# Patient Record
Sex: FEMALE | Race: WHITE | NOT HISPANIC OR LATINO | Employment: OTHER | URBAN - METROPOLITAN AREA
[De-identification: names, ages, dates, MRNs, and addresses within clinical notes are randomized per-mention and may not be internally consistent; named-entity substitution may affect disease eponyms.]

---

## 2020-02-24 NOTE — PROGRESS NOTES
[facility] at [facility]  Follow Up Visit  Aretha Angela  1940    VISIT DATE:  02/25/20    PCP:   Kt Heredia MD  [address] [address]      CC:  new patient  History of aortic valve replacement    Problem List:  1.  Status post bioprosthetic aortic valve replacement severe Aortic Stenosis  -Status post 21 mm Arce magna ease pericardial prosthetic valve in 2010 with Dr. Flores  2. -No obstructive coronary disease during preoperative work-up at that time  3. Hypertension  4.  GERD      Transthoracic Echocardiogram 07/30/2018 (Dr. Vish Tran)  Normal sized Left ventricle  Mild Left ventricular hypertrophy  Visually estimated ejection fraction 55% +/- 5%  Abnormal systolic strain pattern  Normal left ventricular diastolic function  Mild Aortic valve regurgitation  Mild aortic stenosis, aortic valve pressure half-time was 517 ms, aortic valve area was 1.25 cm², the peak gradient was 33 mmHg, with a mean gradient of 20 mmHg  No masses or thrombi are seen    EKG 07/23/2018 ([facility])  Sinus Rhythm  Left Axis deviation  Left bundle branch block  Possible lateral infarct-age undetermined  Possible inferior infarct-age undetermined    Right Mini Thoracotomy 05/05/2010  Aortic Valve Replacement using a 21-mm Arce Magna Ease pericardial prosthesis    History of Present Illness:  Aretha Angela  Is a 79 y.o. female with pertinent cardiac history detailed above.  Patient previously followed with Dr. Tran at [facility].  At last visit with him in 2018 was doing well, remaining active playing tennis and going on long hikes.  Echo at that time showed mild aortic stenosis and mild regurgitation.  She was noted to have a murmur at that time.  She still has a murmur today.  She still plays tennis 3 days a week no chest pain no dyspnea no edema.  Blood pressure elevated in the office states she does not routinely check at home but is often  elevated at other doctors visits.  EKG today sinus bradycardia with PACs otherwise it normal.  Reviewed recent labs which shows LDL was 102 normal CBC normal electrolytes.  Patient endorses no other new concerns today.  Just needed to reestablish care after her insurance plan changed coverage for her previous provider.      Patient Active Problem List    Diagnosis Date Noted   • S/P aortic valve replacement with bioprosthetic valve 02/25/2020     Note Last Updated: 2/25/2020     #21 Arce magna ease 2010         No Known Allergies    Social History     Socioeconomic History   • Marital status:      Spouse name: Not on file   • Number of children: Not on file   • Years of education: Not on file   • Highest education level: Master's degree (e.g., MA, MS, Tim, MEd, MSW, ANDREA)   Tobacco Use   • Smoking status: Former Smoker   • Smokeless tobacco: Never Used   • Tobacco comment: quit 1970's   Substance and Sexual Activity   • Alcohol use: Yes     Alcohol/week: 2.0 standard drinks     Types: 2 Glasses of wine per week     Frequency: Never   • Drug use: Never       History reviewed. No pertinent family history.    Current Medications:    Current Outpatient Medications:   •  aspirin 81 MG chewable tablet, Chew 81 mg Daily., Disp: , Rfl:   •  metoprolol succinate XL (TOPROL-XL) 50 MG 24 hr tablet, Take 50 mg by mouth Daily., Disp: , Rfl:   •  NIFEdipine XL (PROCARDIA XL) 60 MG 24 hr tablet, Take 60 mg by mouth Daily., Disp: , Rfl:   •  lisinopril (PRINIVIL,ZESTRIL) 10 MG tablet, Take 1 tablet by mouth Daily., Disp: 30 tablet, Rfl: 11     Review of Systems   Cardiovascular: Negative for chest pain, dyspnea on exertion, irregular heartbeat, leg swelling, orthopnea, palpitations and syncope.   Respiratory: Negative for shortness of breath.    All other systems reviewed and are negative.      Vitals:    02/25/20 1509   BP: 180/100   Pulse: 63   Resp: 16   SpO2: 96%   Weight: 60.9 kg (134 lb 3.2 oz)   Height: 166.4 cm  "(65.5\")       Physical Exam   Constitutional: She is oriented to person, place, and time. She appears well-developed and well-nourished.   Eyes: EOM are normal.   Neck: Neck supple.   Cardiovascular: Normal rate, regular rhythm and intact distal pulses.   Murmur (2/6 systolic murmur with radiation to carotids) heard.  Pulmonary/Chest: Effort normal. She has no rales.   Abdominal: Soft. Bowel sounds are normal.   Musculoskeletal: She exhibits no edema.   Neurological: She is alert and oriented to person, place, and time.   Psychiatric: She has a normal mood and affect.       Diagnostic Data:    ECG 12 Lead  Date/Time: 2/25/2020 3:25 PM  Performed by: Keyur Bermudez MD  Authorized by: Keyur Bermudez MD   Previous ECG: no previous ECG available  Rhythm: sinus bradycardia  Ectopy: atrial premature contractions  Rate: bradycardic  BPM: 53  QRS axis: normal    Clinical impression: abnormal EKG        No bundle branch block on today's EKG  No results found for: HGBA1C    COMPLETE BLOOD COUNT 01/28/2020   WBC 7.2   RBC 4.52   Hemoglobin 13.5   Hematocrit 41.2   MCV 91   MCH 29.9   MCHC 32.8   RDW 12.6   Platelets 258     BASIC METABOLIC PANEL 01/28/2020   Sodium 140   Potassium 4.6   Chloride 104   Carbon Dioxide 26   Anion Gap 14.6   Glucose 95   BUN 23 High   Creatinine 0.86   Calcium 10.6   Serum Osmolality 387.4   eGFR Non  >60   eGFR African American >60     HEPATIC FUNCTION PANEL 01/28/2020   Total Protein 7.0   Albumin 4.4   Globulin 2.6   A/G Ratio 1.7   Alkaline Phosphatase 63   ALT/SGPT 11   AST/SGOT 17   Total Bilirubin 0.4   Direct Bilirubin 0.1   Fractionated Bilirubin 0.3     LIPID PANEL 01/28/2020   Total Cholesterol 198   Triglycerides 105   HDL 75.3    High   VLDL 21   Chol/HDL Risk Factor 2.6   FT4, THYROXINE, FREE 01/28/2020   Thyroxine, Free 1.11     THYROID STIMULATING HORMONE 01/28/2020   TSH 0.92     VITAMIN D, 25-HYDROXY 01/28/2020   Vitamin D 18.6 Low "       Assessment:   Diagnosis Plan   1. Essential hypertension  ECG 12 Lead   2. History of prosthetic aortic valve replacement  Adult Transthoracic Echo Complete W/ Cont if Necessary Per Protocol       Plan:    -We will repeat echocardiogram to assess stability of her bioprosthetic aortic valve.  History of mild aortic stenosis and regurgitation in 2018  -Blood pressure not optimally controlled on a moderate dose of nifedipine we will add lisinopril 10 mg daily and reassess  -Advised diet and exercise for control blood pressure.  She states she plays tennis 3 days weekly  -Continue other current medication    We will follow-up in 3 months        Keyur Bermudez MD

## 2020-02-25 ENCOUNTER — CONSULT (OUTPATIENT)
Dept: CARDIOLOGY | Facility: CLINIC | Age: 80
End: 2020-02-25

## 2020-02-25 VITALS
WEIGHT: 134.2 LBS | HEIGHT: 66 IN | OXYGEN SATURATION: 96 % | SYSTOLIC BLOOD PRESSURE: 180 MMHG | RESPIRATION RATE: 16 BRPM | BODY MASS INDEX: 21.57 KG/M2 | HEART RATE: 63 BPM | DIASTOLIC BLOOD PRESSURE: 100 MMHG

## 2020-02-25 DIAGNOSIS — I10 ESSENTIAL HYPERTENSION: Primary | ICD-10-CM

## 2020-02-25 DIAGNOSIS — Z95.2 HISTORY OF PROSTHETIC AORTIC VALVE REPLACEMENT: ICD-10-CM

## 2020-02-25 PROBLEM — Z95.3 S/P AORTIC VALVE REPLACEMENT WITH BIOPROSTHETIC VALVE: Status: ACTIVE | Noted: 2020-02-25

## 2020-02-25 PROCEDURE — 93000 ELECTROCARDIOGRAM COMPLETE: CPT | Performed by: INTERNAL MEDICINE

## 2020-02-25 PROCEDURE — 99204 OFFICE O/P NEW MOD 45 MIN: CPT | Performed by: INTERNAL MEDICINE

## 2020-02-25 RX ORDER — METOPROLOL SUCCINATE 50 MG/1
50 TABLET, EXTENDED RELEASE ORAL DAILY
COMMUNITY
Start: 2020-02-14

## 2020-02-25 RX ORDER — LISINOPRIL 10 MG/1
10 TABLET ORAL DAILY
Qty: 30 TABLET | Refills: 11 | OUTPATIENT
Start: 2020-02-25 | End: 2022-06-27

## 2020-02-25 RX ORDER — ASPIRIN 81 MG/1
81 TABLET, CHEWABLE ORAL DAILY
COMMUNITY

## 2020-02-25 RX ORDER — NIFEDIPINE 60 MG/1
60 TABLET, EXTENDED RELEASE ORAL DAILY
COMMUNITY
Start: 2020-02-14 | End: 2022-06-27

## 2020-03-11 ENCOUNTER — APPOINTMENT (OUTPATIENT)
Dept: CARDIOLOGY | Facility: HOSPITAL | Age: 80
End: 2020-03-11

## 2020-03-24 ENCOUNTER — APPOINTMENT (OUTPATIENT)
Dept: CARDIOLOGY | Facility: HOSPITAL | Age: 80
End: 2020-03-24

## 2020-05-28 ENCOUNTER — HOSPITAL ENCOUNTER (OUTPATIENT)
Dept: CARDIOLOGY | Facility: HOSPITAL | Age: 80
Discharge: HOME | End: 2020-05-28
Admitting: INTERNAL MEDICINE

## 2020-05-28 VITALS — WEIGHT: 134 LBS | BODY MASS INDEX: 21.53 KG/M2 | HEIGHT: 66 IN

## 2020-05-28 DIAGNOSIS — Z95.2 HISTORY OF PROSTHETIC AORTIC VALVE REPLACEMENT: ICD-10-CM

## 2020-05-28 LAB
AORTIC DIMENSIONLESS INDEX: 0.4 (DI)
BH CV ECHO MEAS - AI DEC SLOPE: 221 CM/SEC^2
BH CV ECHO MEAS - AI MAX PG: 87.9 MMHG
BH CV ECHO MEAS - AI MAX VEL: 468.7 CM/SEC
BH CV ECHO MEAS - AI P1/2T: 621.1 MSEC
BH CV ECHO MEAS - AO MAX PG (FULL): 32.7 MMHG
BH CV ECHO MEAS - AO MAX PG: 40 MMHG
BH CV ECHO MEAS - AO MEAN PG (FULL): 16.7 MMHG
BH CV ECHO MEAS - AO MEAN PG: 21.3 MMHG
BH CV ECHO MEAS - AO ROOT AREA (BSA CORRECTED): 1.8
BH CV ECHO MEAS - AO ROOT AREA: 7.1 CM^2
BH CV ECHO MEAS - AO ROOT DIAM: 3 CM
BH CV ECHO MEAS - AO V2 MAX: 317.7 CM/SEC
BH CV ECHO MEAS - AO V2 MEAN: 208.7 CM/SEC
BH CV ECHO MEAS - AO V2 VTI: 70.3 CM
BH CV ECHO MEAS - AVA(I,A): 1.5 CM^2
BH CV ECHO MEAS - AVA(I,D): 1.5 CM^2
BH CV ECHO MEAS - AVA(V,A): 1.3 CM^2
BH CV ECHO MEAS - AVA(V,D): 1.3 CM^2
BH CV ECHO MEAS - BSA(HAYCOCK): 1.7 M^2
BH CV ECHO MEAS - BSA: 1.7 M^2
BH CV ECHO MEAS - BZI_BMI: 22.3 KILOGRAMS/M^2
BH CV ECHO MEAS - BZI_METRIC_HEIGHT: 165.1 CM
BH CV ECHO MEAS - BZI_METRIC_WEIGHT: 60.8 KG
BH CV ECHO MEAS - EDV(CUBED): 75.7 ML
BH CV ECHO MEAS - EDV(MOD-SP2): 54 ML
BH CV ECHO MEAS - EDV(MOD-SP4): 55 ML
BH CV ECHO MEAS - EDV(TEICH): 79.9 ML
BH CV ECHO MEAS - EF(CUBED): 62.9 %
BH CV ECHO MEAS - EF(MOD-BP): 64 %
BH CV ECHO MEAS - EF(MOD-SP2): 63 %
BH CV ECHO MEAS - EF(MOD-SP4): 63.6 %
BH CV ECHO MEAS - EF(TEICH): 54.8 %
BH CV ECHO MEAS - ESV(CUBED): 28.1 ML
BH CV ECHO MEAS - ESV(MOD-SP2): 20 ML
BH CV ECHO MEAS - ESV(MOD-SP4): 20 ML
BH CV ECHO MEAS - ESV(TEICH): 36.2 ML
BH CV ECHO MEAS - FS: 28.1 %
BH CV ECHO MEAS - IVS/LVPW: 1.2
BH CV ECHO MEAS - IVSD: 1.2 CM
BH CV ECHO MEAS - LA DIMENSION: 3.6 CM
BH CV ECHO MEAS - LA/AO: 1.2
BH CV ECHO MEAS - LAD MAJOR: 5 CM
BH CV ECHO MEAS - LAT PEAK E' VEL: 8.2 CM/SEC
BH CV ECHO MEAS - LATERAL E/E' RATIO: 12.1
BH CV ECHO MEAS - LV DIASTOLIC VOL/BSA (35-75): 33 ML/M^2
BH CV ECHO MEAS - LV MASS(C)D: 168.2 GRAMS
BH CV ECHO MEAS - LV MASS(C)DI: 100.8 GRAMS/M^2
BH CV ECHO MEAS - LV MAX PG: 7.3 MMHG
BH CV ECHO MEAS - LV MEAN PG: 4.7 MMHG
BH CV ECHO MEAS - LV SYSTOLIC VOL/BSA (12-30): 12 ML/M^2
BH CV ECHO MEAS - LV V1 MAX: 135.3 CM/SEC
BH CV ECHO MEAS - LV V1 MEAN: 98.3 CM/SEC
BH CV ECHO MEAS - LV V1 VTI: 33.6 CM
BH CV ECHO MEAS - LVIDD: 4.2 CM
BH CV ECHO MEAS - LVIDS: 3 CM
BH CV ECHO MEAS - LVLD AP2: 7 CM
BH CV ECHO MEAS - LVLD AP4: 7.3 CM
BH CV ECHO MEAS - LVLS AP2: 6 CM
BH CV ECHO MEAS - LVLS AP4: 6.2 CM
BH CV ECHO MEAS - LVOT AREA (M): 3.1 CM^2
BH CV ECHO MEAS - LVOT AREA: 3.1 CM^2
BH CV ECHO MEAS - LVOT DIAM: 2 CM
BH CV ECHO MEAS - LVPWD: 1.1 CM
BH CV ECHO MEAS - MED PEAK E' VEL: 6 CM/SEC
BH CV ECHO MEAS - MEDIAL E/E' RATIO: 16.5
BH CV ECHO MEAS - MV A MAX VEL: 127 CM/SEC
BH CV ECHO MEAS - MV DEC TIME: 0.34 SEC
BH CV ECHO MEAS - MV E MAX VEL: 99.7 CM/SEC
BH CV ECHO MEAS - MV E/A: 0.79
BH CV ECHO MEAS - PA ACC SLOPE: 491 CM/SEC^2
BH CV ECHO MEAS - PA ACC TIME: 0.14 SEC
BH CV ECHO MEAS - PA PR(ACCEL): 15.6 MMHG
BH CV ECHO MEAS - RAP SYSTOLE: 3 MMHG
BH CV ECHO MEAS - RVDD: 2.4 CM
BH CV ECHO MEAS - RVSP: 32 MMHG
BH CV ECHO MEAS - SI(AO): 298 ML/M^2
BH CV ECHO MEAS - SI(CUBED): 28.5 ML/M^2
BH CV ECHO MEAS - SI(LVOT): 63.3 ML/M^2
BH CV ECHO MEAS - SI(MOD-SP2): 20.4 ML/M^2
BH CV ECHO MEAS - SI(MOD-SP4): 21 ML/M^2
BH CV ECHO MEAS - SI(TEICH): 26.2 ML/M^2
BH CV ECHO MEAS - SV(AO): 497.2 ML
BH CV ECHO MEAS - SV(CUBED): 47.6 ML
BH CV ECHO MEAS - SV(LVOT): 105.6 ML
BH CV ECHO MEAS - SV(MOD-SP2): 34 ML
BH CV ECHO MEAS - SV(MOD-SP4): 35 ML
BH CV ECHO MEAS - SV(TEICH): 43.8 ML
BH CV ECHO MEAS - TAPSE (>1.6): 2.2 CM2
BH CV ECHO MEAS - TR MAX PG: 29 MMHG
BH CV ECHO MEAS - TR MAX VEL: 267 CM/SEC
BH CV ECHO MEASUREMENTS AVERAGE E/E' RATIO: 14.04
BH CV VAS BP LEFT ARM: NORMAL MMHG
BH CV XLRA - RV BASE: 3.5 CM
BH CV XLRA - RV LENGTH: 8.1 CM
BH CV XLRA - RV MID: 2.5 CM
BH CV XLRA - TDI S': 12.4 CM/SEC
LEFT ATRIUM VOLUME INDEX: 21 ML/M^2
LEFT ATRIUM VOLUME: 35 ML

## 2020-05-28 PROCEDURE — 93306 TTE W/DOPPLER COMPLETE: CPT

## 2020-05-28 PROCEDURE — 93306 TTE W/DOPPLER COMPLETE: CPT | Performed by: INTERNAL MEDICINE

## 2022-06-27 ENCOUNTER — APPOINTMENT (OUTPATIENT)
Dept: GENERAL RADIOLOGY | Facility: HOSPITAL | Age: 82
End: 2022-06-27

## 2022-06-27 ENCOUNTER — HOSPITAL ENCOUNTER (EMERGENCY)
Facility: HOSPITAL | Age: 82
Discharge: HOME | End: 2022-06-27
Attending: EMERGENCY MEDICINE | Admitting: EMERGENCY MEDICINE

## 2022-06-27 VITALS
RESPIRATION RATE: 20 BRPM | HEART RATE: 92 BPM | SYSTOLIC BLOOD PRESSURE: 185 MMHG | DIASTOLIC BLOOD PRESSURE: 103 MMHG | TEMPERATURE: 100.6 F | BODY MASS INDEX: 22.88 KG/M2 | OXYGEN SATURATION: 100 % | WEIGHT: 134 LBS | HEIGHT: 64 IN

## 2022-06-27 DIAGNOSIS — R07.9 CHEST PAIN, UNSPECIFIED TYPE: Primary | ICD-10-CM

## 2022-06-27 DIAGNOSIS — R50.9 FEBRILE ILLNESS, ACUTE: ICD-10-CM

## 2022-06-27 LAB
ALBUMIN SERPL-MCNC: 3.9 G/DL (ref 3.5–5.2)
ALBUMIN/GLOB SERPL: 1.3 G/DL
ALP SERPL-CCNC: 87 U/L (ref 39–117)
ALT SERPL W P-5'-P-CCNC: 10 U/L (ref 1–33)
ANION GAP SERPL CALCULATED.3IONS-SCNC: 10 MMOL/L (ref 5–15)
AST SERPL-CCNC: 17 U/L (ref 1–32)
BASOPHILS # BLD AUTO: 0.05 10*3/MM3 (ref 0–0.2)
BASOPHILS NFR BLD AUTO: 0.5 % (ref 0–1.5)
BILIRUB SERPL-MCNC: 0.3 MG/DL (ref 0–1.2)
BILIRUB UR QL STRIP: NEGATIVE
BUN SERPL-MCNC: 18 MG/DL (ref 8–23)
BUN/CREAT SERPL: 24.3 (ref 7–25)
CALCIUM SPEC-SCNC: 9.4 MG/DL (ref 8.6–10.5)
CHLORIDE SERPL-SCNC: 105 MMOL/L (ref 98–107)
CLARITY UR: CLEAR
CO2 SERPL-SCNC: 24 MMOL/L (ref 22–29)
COLOR UR: YELLOW
CREAT SERPL-MCNC: 0.74 MG/DL (ref 0.57–1)
D-LACTATE SERPL-SCNC: 1.4 MMOL/L (ref 0.5–2)
DEPRECATED RDW RBC AUTO: 44.6 FL (ref 37–54)
EGFRCR SERPLBLD CKD-EPI 2021: 80.9 ML/MIN/1.73
EOSINOPHIL # BLD AUTO: 0.02 10*3/MM3 (ref 0–0.4)
EOSINOPHIL NFR BLD AUTO: 0.2 % (ref 0.3–6.2)
ERYTHROCYTE [DISTWIDTH] IN BLOOD BY AUTOMATED COUNT: 13.1 % (ref 12.3–15.4)
FLUAV RNA RESP QL NAA+PROBE: NOT DETECTED
FLUBV RNA RESP QL NAA+PROBE: NOT DETECTED
GLOBULIN UR ELPH-MCNC: 2.9 GM/DL
GLUCOSE SERPL-MCNC: 120 MG/DL (ref 65–99)
GLUCOSE UR STRIP-MCNC: NEGATIVE MG/DL
HCT VFR BLD AUTO: 39.1 % (ref 34–46.6)
HGB BLD-MCNC: 12.6 G/DL (ref 12–15.9)
HGB UR QL STRIP.AUTO: NEGATIVE
HOLD SPECIMEN: NORMAL
IMM GRANULOCYTES # BLD AUTO: 0.03 10*3/MM3 (ref 0–0.05)
IMM GRANULOCYTES NFR BLD AUTO: 0.3 % (ref 0–0.5)
KETONES UR QL STRIP: NEGATIVE
LEUKOCYTE ESTERASE UR QL STRIP.AUTO: NEGATIVE
LIPASE SERPL-CCNC: 17 U/L (ref 13–60)
LYMPHOCYTES # BLD AUTO: 0.75 10*3/MM3 (ref 0.7–3.1)
LYMPHOCYTES NFR BLD AUTO: 7.8 % (ref 19.6–45.3)
MCH RBC QN AUTO: 30 PG (ref 26.6–33)
MCHC RBC AUTO-ENTMCNC: 32.2 G/DL (ref 31.5–35.7)
MCV RBC AUTO: 93.1 FL (ref 79–97)
MONOCYTES # BLD AUTO: 0.59 10*3/MM3 (ref 0.1–0.9)
MONOCYTES NFR BLD AUTO: 6.1 % (ref 5–12)
NEUTROPHILS NFR BLD AUTO: 8.19 10*3/MM3 (ref 1.7–7)
NEUTROPHILS NFR BLD AUTO: 85.1 % (ref 42.7–76)
NITRITE UR QL STRIP: NEGATIVE
NRBC BLD AUTO-RTO: 0 /100 WBC (ref 0–0.2)
NT-PROBNP SERPL-MCNC: 340.4 PG/ML (ref 0–1800)
PH UR STRIP.AUTO: 8 [PH] (ref 5–8)
PLATELET # BLD AUTO: 224 10*3/MM3 (ref 140–450)
PMV BLD AUTO: 11.6 FL (ref 6–12)
POTASSIUM SERPL-SCNC: 4.4 MMOL/L (ref 3.5–5.2)
PROT SERPL-MCNC: 6.8 G/DL (ref 6–8.5)
PROT UR QL STRIP: ABNORMAL
RBC # BLD AUTO: 4.2 10*6/MM3 (ref 3.77–5.28)
SARS-COV-2 RNA RESP QL NAA+PROBE: NOT DETECTED
SODIUM SERPL-SCNC: 139 MMOL/L (ref 136–145)
SP GR UR STRIP: 1.02 (ref 1–1.03)
TROPONIN T SERPL-MCNC: <0.01 NG/ML (ref 0–0.03)
TROPONIN T SERPL-MCNC: <0.01 NG/ML (ref 0–0.03)
UROBILINOGEN UR QL STRIP: ABNORMAL
WBC NRBC COR # BLD: 9.63 10*3/MM3 (ref 3.4–10.8)
WHOLE BLOOD HOLD COAG: NORMAL
WHOLE BLOOD HOLD SPECIMEN: NORMAL

## 2022-06-27 PROCEDURE — 85025 COMPLETE CBC W/AUTO DIFF WBC: CPT | Performed by: EMERGENCY MEDICINE

## 2022-06-27 PROCEDURE — 36415 COLL VENOUS BLD VENIPUNCTURE: CPT

## 2022-06-27 PROCEDURE — 83605 ASSAY OF LACTIC ACID: CPT | Performed by: EMERGENCY MEDICINE

## 2022-06-27 PROCEDURE — 87636 SARSCOV2 & INF A&B AMP PRB: CPT | Performed by: EMERGENCY MEDICINE

## 2022-06-27 PROCEDURE — 84484 ASSAY OF TROPONIN QUANT: CPT | Performed by: EMERGENCY MEDICINE

## 2022-06-27 PROCEDURE — 81003 URINALYSIS AUTO W/O SCOPE: CPT | Performed by: EMERGENCY MEDICINE

## 2022-06-27 PROCEDURE — 93005 ELECTROCARDIOGRAM TRACING: CPT | Performed by: EMERGENCY MEDICINE

## 2022-06-27 PROCEDURE — 83690 ASSAY OF LIPASE: CPT | Performed by: EMERGENCY MEDICINE

## 2022-06-27 PROCEDURE — 83880 ASSAY OF NATRIURETIC PEPTIDE: CPT

## 2022-06-27 PROCEDURE — 71045 X-RAY EXAM CHEST 1 VIEW: CPT

## 2022-06-27 PROCEDURE — 99284 EMERGENCY DEPT VISIT MOD MDM: CPT

## 2022-06-27 PROCEDURE — 80053 COMPREHEN METABOLIC PANEL: CPT | Performed by: EMERGENCY MEDICINE

## 2022-06-27 RX ORDER — SODIUM CHLORIDE 0.9 % (FLUSH) 0.9 %
10 SYRINGE (ML) INJECTION AS NEEDED
Status: DISCONTINUED | OUTPATIENT
Start: 2022-06-27 | End: 2022-06-27 | Stop reason: HOSPADM

## 2022-06-29 NOTE — PROGRESS NOTES
[facility]  Follow Up Visit  Aretha Angela  1940    VISIT DATE:  06/30/22    PCP:   Kt Heredia MD  [address] [address]          CC:  AORTIC VALVE REPLACEMENT       Problem List:  1.  Status post bioprosthetic aortic valve replacement severe Aortic Stenosis  -Status post 21 mm Arce magna ease pericardial prosthetic valve in 2010 with Dr. Flores  2. -No obstructive coronary disease during preoperative work-up at that time  3. Hypertension  4.  GERD with prior esphageal dilation  5.  Hyperlipidemia        Transthoracic Echocardiogram 07/30/2018 (Dr. Vish Tran)  Normal sized Left ventricle  Mild Left ventricular hypertrophy  Visually estimated ejection fraction 55% +/- 5%  Abnormal systolic strain pattern  Normal left ventricular diastolic function  Mild Aortic valve regurgitation  Mild aortic stenosis, aortic valve pressure half-time was 517 ms, aortic valve area was 1.25 cm², the peak gradient was 33 mmHg, with a mean gradient of 20 mmHg  No masses or thrombi are seen       Right Mini Thoracotomy 05/05/2010  Aortic Valve Replacement using a 21-mm Arce Magna Ease pericardial prosthesis    Echo May 2020  · Left ventricular systolic function is normal. Calculated EF = 64.0%. Estimated EF appears to be in the range of 61 - 65%.  · Left ventricular wall thickness is consistent with mild septal asymmetric hypertrophy. Left ventricular diastolic dysfunction is noted (grade I) consistent with impaired relaxation.  · There is a 21 mm bovine pericardial bioprosthetic valve present. Arce magna ease.  · Mild aortic valve regurgitation is present. Appearance of the jet suggest this could be paravalvular regurgitation  · Mild aortic valve stenosis is present. Aortic valve dimensionless index is 0.4. Mean gradient 21 mmHg. Stable compared with 2018 study.        History of Present Illness:  Aretha Angela  Is a 82 y.o. female with pertinent cardiac  history detailed above.  Patient seen 2 years ago.  Has a history of bioprosthetic aortic valve replacement.  She was in the ED on 6/27 with complaints of chest pain worse with deep breathing.  Stated it started at night, felt different than her previous GERD related discomfort.  When she was there and more tachycardic there was a rate related LBBB.  Lab work-up that day included negative troponins.  EKG showed sinus rhythm with left axis deviation, no acute ST changes.  She did have low-grade fever.  States it lasted 8 hours.  She states it felt better standing up, worse laying down.    She states she is active.  She walks regularly without exacerbation of chest pain.  She has not had presyncope or heart failure symptoms like orthopnea or edema.  EKG today shows sinus rhythm with poor R wave depression and nonspecific ST changes.  Her last echo in 2020 did show some bioprosthetic valve dysfunction with both mild stenosis and regurgitation.  The patient has not had any other episodes of chest pain besides on the ED visit    Patient Active Problem List    Diagnosis Date Noted   • S/P aortic valve replacement with bioprosthetic valve 02/25/2020     Note Last Updated: 2/25/2020     #21 Arce magna ease 2010         No Known Allergies    Social History     Socioeconomic History   • Marital status:    • Highest education level: Master's degree (e.g., MA, MS, Tim, MEd, MSW, ANDREA)   Tobacco Use   • Smoking status: Former Smoker   • Smokeless tobacco: Never Used   • Tobacco comment: quit 1970's   Substance and Sexual Activity   • Alcohol use: Yes     Alcohol/week: 2.0 standard drinks     Types: 2 Glasses of wine per week   • Drug use: Never       History reviewed. No pertinent family history.    Current Medications:    Current Outpatient Medications:   •  aspirin 81 MG chewable tablet, Chew 81 mg Daily., Disp: , Rfl:   •  metoprolol succinate XL (TOPROL-XL) 50 MG 24 hr tablet, Take 50 mg by mouth Daily., Disp: ,  "Rfl:      Review of Systems   Cardiovascular: Positive for chest pain. Negative for dyspnea on exertion, irregular heartbeat, leg swelling, near-syncope, orthopnea, palpitations and syncope.   Respiratory: Negative for shortness of breath.        Vitals:    06/30/22 0843   BP: 130/72   BP Location: Right arm   Patient Position: Sitting   Pulse: 87   SpO2: 97%   Weight: 61.7 kg (136 lb)   Height: 162.6 cm (64\")       Physical Exam  Constitutional:       Appearance: Normal appearance.   Cardiovascular:      Rate and Rhythm: Normal rate and regular rhythm.      Pulses: Normal pulses.      Comments: 3/6 systolic murmur with radiation to the carotids  Pulmonary:      Effort: Pulmonary effort is normal.      Breath sounds: Normal breath sounds.   Abdominal:      Palpations: Abdomen is soft.   Musculoskeletal:      Right lower leg: No edema.      Left lower leg: No edema.   Neurological:      General: No focal deficit present.      Mental Status: She is alert.         Diagnostic Data:    ECG 12 Lead    Date/Time: 6/30/2022 9:13 AM  Performed by: Keyur Bermudez MD  Authorized by: Keyur Bermudez MD   Comparison: compared with previous ECG from 6/27/2022  Comparison to previous ECG: Less voltage anterior leads  Rhythm: sinus rhythm  Rate: normal  BPM: 87  QRS axis: normal  Other findings: non-specific ST-T wave changes    Clinical impression: abnormal EKG          No results found for: CHLPL, TRIG, HDL, LDLDIRECT  Lab Results   Component Value Date    GLUCOSE 120 (H) 06/27/2022    BUN 18 06/27/2022    CREATININE 0.74 06/27/2022     06/27/2022    K 4.4 06/27/2022     06/27/2022    CO2 24.0 06/27/2022     No results found for: HGBA1C  Lab Results   Component Value Date    WBC 9.63 06/27/2022    HGB 12.6 06/27/2022    HCT 39.1 06/27/2022     06/27/2022       Assessment:   Diagnosis Plan   1. S/P aortic valve replacement with bioprosthetic valve  ECG 12 Lead    Adult Transthoracic Echo " Complete W/ Cont if Necessary Per Protocol       Plan:      1.  Bioprosthetic aortic valve  -Last echo 2020 with mild stenosis and regurgitation  -Prominent murmur on exam, recent chest pain.  Obtain echo today to reevaluate valve function    2.  Chest pain  -Ruled out for ACS during ER ER visit  -Nonobstructive disease in 2010 prior to valve replacement  -EKG non-specific ST changes, poor R wave progression  -Obtaining echo today.  Depending on her valve function that we will determine how to best reevaluate her coronaries, catheterization versus stress test.  We will discussed with her later today after echo reviewed  -Continue ASA    3.  Hypertension  -Normal creatinine and potassium  -Controlled on metoprolol    4.  Hyperlipidemia:  -Total cholesterol 211, triglycerides 139, HDL 74,   A1c 5.6  -Recommendations and statin pending ischemic evaluation    Follow-up 3 months    Addendum: Echo from later today reviewed and shows  · Calculated left ventricular EF = 62.6%  · Left ventricular wall thickness is consistent with moderate to severe septal asymmetric hypertrophy. Sigmoid-shaped ventricular septum is present  · No LVOT obstruction.  · Mild aortic valve stenosis is present. Mean gradient 23 mmHg dimensionless index 0.30. Relatively stable findings compared with 2020 echo  · There is mild paravalvular aortic regurgitation noted. Pressure half-time 633 ms.       Since the valve does not have high degree of dysfunction I believe we can start with a noninvasive evaluation for CAD.  Recommend nuclear stress test, Lexiscan given her rate dependent left bundle branch block.  Discussed if she has recurrent significant episodes of chest pain to be present to ED for evaluation.    Keyur Bermudez MD [facility]

## 2022-06-30 ENCOUNTER — OFFICE VISIT (OUTPATIENT)
Dept: CARDIOLOGY | Facility: CLINIC | Age: 82
End: 2022-06-30

## 2022-06-30 ENCOUNTER — HOSPITAL ENCOUNTER (OUTPATIENT)
Dept: CARDIOLOGY | Facility: HOSPITAL | Age: 82
Discharge: HOME | End: 2022-06-30
Admitting: INTERNAL MEDICINE

## 2022-06-30 VITALS
SYSTOLIC BLOOD PRESSURE: 130 MMHG | HEIGHT: 64 IN | DIASTOLIC BLOOD PRESSURE: 72 MMHG | WEIGHT: 136 LBS | HEART RATE: 87 BPM | BODY MASS INDEX: 23.22 KG/M2 | OXYGEN SATURATION: 97 %

## 2022-06-30 DIAGNOSIS — Z95.3 S/P AORTIC VALVE REPLACEMENT WITH BIOPROSTHETIC VALVE: Primary | ICD-10-CM

## 2022-06-30 DIAGNOSIS — R07.9 CHRONIC CHEST PAIN WITH LOW TO MODERATE RISK FOR CAD: Primary | ICD-10-CM

## 2022-06-30 DIAGNOSIS — Z91.89 CHRONIC CHEST PAIN WITH LOW TO MODERATE RISK FOR CAD: Primary | ICD-10-CM

## 2022-06-30 DIAGNOSIS — G89.29 CHRONIC CHEST PAIN WITH LOW TO MODERATE RISK FOR CAD: Primary | ICD-10-CM

## 2022-06-30 DIAGNOSIS — Z95.3 S/P AORTIC VALVE REPLACEMENT WITH BIOPROSTHETIC VALVE: ICD-10-CM

## 2022-06-30 LAB
AORTIC DIMENSIONLESS INDEX: 0.3 (DI)
BH CV ECHO MEAS - AI P1/2T: 639.7 MSEC
BH CV ECHO MEAS - AO MAX PG: 37.2 MMHG
BH CV ECHO MEAS - AO MEAN PG: 23 MMHG
BH CV ECHO MEAS - AO ROOT DIAM: 3.1 CM
BH CV ECHO MEAS - AO V2 MAX: 304.5 CM/SEC
BH CV ECHO MEAS - AO V2 VTI: 67.1 CM
BH CV ECHO MEAS - AVA(I,D): 1.09 CM2
BH CV ECHO MEAS - EDV(CUBED): 34.3 ML
BH CV ECHO MEAS - EDV(MOD-SP2): 101 ML
BH CV ECHO MEAS - EDV(MOD-SP4): 116 ML
BH CV ECHO MEAS - EF(MOD-BP): 62.6 %
BH CV ECHO MEAS - EF(MOD-SP2): 58.5 %
BH CV ECHO MEAS - EF(MOD-SP4): 64.3 %
BH CV ECHO MEAS - ESV(CUBED): 13 ML
BH CV ECHO MEAS - ESV(MOD-SP2): 41.9 ML
BH CV ECHO MEAS - ESV(MOD-SP4): 41.4 ML
BH CV ECHO MEAS - FS: 27.6 %
BH CV ECHO MEAS - IVS/LVPW: 1.24 CM
BH CV ECHO MEAS - IVSD: 1.57 CM
BH CV ECHO MEAS - LA DIMENSION: 4.1 CM
BH CV ECHO MEAS - LAT PEAK E' VEL: 6.2 CM/SEC
BH CV ECHO MEAS - LV DIASTOLIC VOL/BSA (35-75): 69.9 CM2
BH CV ECHO MEAS - LV MASS(C)D: 159.1 GRAMS
BH CV ECHO MEAS - LV MAX PG: 4.1 MMHG
BH CV ECHO MEAS - LV MEAN PG: 3 MMHG
BH CV ECHO MEAS - LV SYSTOLIC VOL/BSA (12-30): 24.9 CM2
BH CV ECHO MEAS - LV V1 MAX: 101.3 CM/SEC
BH CV ECHO MEAS - LV V1 VTI: 25.9 CM
BH CV ECHO MEAS - LVIDD: 3.2 CM
BH CV ECHO MEAS - LVIDS: 2.35 CM
BH CV ECHO MEAS - LVOT AREA: 2.8 CM2
BH CV ECHO MEAS - LVOT DIAM: 1.89 CM
BH CV ECHO MEAS - LVPWD: 1.27 CM
BH CV ECHO MEAS - MED PEAK E' VEL: 4 CM/SEC
BH CV ECHO MEAS - MV A MAX VEL: 117 CM/SEC
BH CV ECHO MEAS - MV DEC TIME: 0.25 MSEC
BH CV ECHO MEAS - MV E MAX VEL: 62.1 CM/SEC
BH CV ECHO MEAS - MV E/A: 0.53
BH CV ECHO MEAS - MV MAX PG: 6.6 MMHG
BH CV ECHO MEAS - MV MEAN PG: 2.6 MMHG
BH CV ECHO MEAS - MV V2 VTI: 33.5 CM
BH CV ECHO MEAS - MVA(VTI): 2.18 CM2
BH CV ECHO MEAS - RAP SYSTOLE: 3 MMHG
BH CV ECHO MEAS - RVSP: 25 MMHG
BH CV ECHO MEAS - SI(MOD-SP2): 35.6 ML/M2
BH CV ECHO MEAS - SI(MOD-SP4): 44.9 ML/M2
BH CV ECHO MEAS - SV(LVOT): 73 ML
BH CV ECHO MEAS - SV(MOD-SP2): 59.1 ML
BH CV ECHO MEAS - SV(MOD-SP4): 74.6 ML
BH CV ECHO MEAS - TAPSE (>1.6): 2.21 CM
BH CV ECHO MEAS - TR MAX PG: 21.6 MMHG
BH CV ECHO MEAS - TR MAX VEL: 232.1 CM/SEC
BH CV ECHO MEASUREMENTS AVERAGE E/E' RATIO: 12.18
BH CV XLRA - RV BASE: 2.8 CM
BH CV XLRA - RV LENGTH: 6.4 CM
BH CV XLRA - RV MID: 2.28 CM
BH CV XLRA - TDI S': 11.6 CM/SEC
LEFT ATRIUM VOLUME INDEX: 21.7 ML/M2
MAXIMAL PREDICTED HEART RATE: 138 BPM
STRESS TARGET HR: 117 BPM

## 2022-06-30 PROCEDURE — 93306 TTE W/DOPPLER COMPLETE: CPT | Performed by: INTERNAL MEDICINE

## 2022-06-30 PROCEDURE — 99214 OFFICE O/P EST MOD 30 MIN: CPT | Performed by: INTERNAL MEDICINE

## 2022-06-30 PROCEDURE — 93000 ELECTROCARDIOGRAM COMPLETE: CPT | Performed by: INTERNAL MEDICINE

## 2022-06-30 PROCEDURE — 93306 TTE W/DOPPLER COMPLETE: CPT

## 2022-07-02 LAB
QT INTERVAL: 358 MS
QT INTERVAL: 362 MS
QT INTERVAL: 384 MS
QTC INTERVAL: 433 MS
QTC INTERVAL: 437 MS
QTC INTERVAL: 495 MS

## 2022-08-15 ENCOUNTER — HOSPITAL ENCOUNTER (OUTPATIENT)
Dept: CARDIOLOGY | Facility: HOSPITAL | Age: 82
Discharge: HOME | End: 2022-08-15

## 2022-08-15 DIAGNOSIS — Z91.89 CHRONIC CHEST PAIN WITH LOW TO MODERATE RISK FOR CAD: ICD-10-CM

## 2022-08-15 DIAGNOSIS — G89.29 CHRONIC CHEST PAIN WITH LOW TO MODERATE RISK FOR CAD: ICD-10-CM

## 2022-08-15 DIAGNOSIS — R07.9 CHRONIC CHEST PAIN WITH LOW TO MODERATE RISK FOR CAD: ICD-10-CM

## 2022-08-15 LAB
MAXIMAL PREDICTED HEART RATE: 138 BPM
STRESS TARGET HR: 117 BPM

## 2023-10-05 ENCOUNTER — APPOINTMENT (OUTPATIENT)
Dept: CT IMAGING | Facility: HOSPITAL | Age: 83
DRG: 069 | End: 2023-10-05
Payer: MEDICARE

## 2023-10-05 ENCOUNTER — APPOINTMENT (OUTPATIENT)
Dept: GENERAL RADIOLOGY | Facility: HOSPITAL | Age: 83
DRG: 069 | End: 2023-10-05
Payer: MEDICARE

## 2023-10-05 ENCOUNTER — HOSPITAL ENCOUNTER (INPATIENT)
Facility: HOSPITAL | Age: 83
LOS: 3 days | Discharge: HOME | DRG: 069 | End: 2023-10-10
Attending: EMERGENCY MEDICINE | Admitting: INTERNAL MEDICINE
Payer: MEDICARE

## 2023-10-05 DIAGNOSIS — R19.7 DIARRHEA, UNSPECIFIED TYPE: ICD-10-CM

## 2023-10-05 DIAGNOSIS — M54.6 ACUTE MIDLINE THORACIC BACK PAIN: ICD-10-CM

## 2023-10-05 DIAGNOSIS — R79.89 ELEVATED D-DIMER: ICD-10-CM

## 2023-10-05 DIAGNOSIS — R15.9 INCONTINENCE OF FECES, UNSPECIFIED FECAL INCONTINENCE TYPE: ICD-10-CM

## 2023-10-05 DIAGNOSIS — R29.898 WEAKNESS OF RIGHT UPPER EXTREMITY: Primary | ICD-10-CM

## 2023-10-05 DIAGNOSIS — I16.0 HYPERTENSIVE URGENCY: ICD-10-CM

## 2023-10-05 DIAGNOSIS — R07.89 ATYPICAL CHEST PAIN: ICD-10-CM

## 2023-10-05 LAB
ALBUMIN SERPL-MCNC: 3.9 G/DL (ref 3.5–5.2)
ALBUMIN/GLOB SERPL: 1.2 G/DL
ALP SERPL-CCNC: 86 U/L (ref 39–117)
ALT SERPL W P-5'-P-CCNC: 9 U/L (ref 1–33)
ANION GAP SERPL CALCULATED.3IONS-SCNC: 8 MMOL/L (ref 5–15)
AST SERPL-CCNC: 16 U/L (ref 1–32)
BASOPHILS # BLD AUTO: 0.05 10*3/MM3 (ref 0–0.2)
BASOPHILS NFR BLD AUTO: 0.6 % (ref 0–1.5)
BILIRUB SERPL-MCNC: 0.2 MG/DL (ref 0–1.2)
BUN SERPL-MCNC: 18 MG/DL (ref 8–23)
BUN/CREAT SERPL: 24 (ref 7–25)
CALCIUM SPEC-SCNC: 9.3 MG/DL (ref 8.6–10.5)
CHLORIDE SERPL-SCNC: 106 MMOL/L (ref 98–107)
CO2 SERPL-SCNC: 25 MMOL/L (ref 22–29)
CREAT SERPL-MCNC: 0.75 MG/DL (ref 0.57–1)
D-LACTATE SERPL-SCNC: 1.3 MMOL/L (ref 0.5–2)
DEPRECATED RDW RBC AUTO: 42.7 FL (ref 37–54)
EGFRCR SERPLBLD CKD-EPI 2021: 79.1 ML/MIN/1.73
EOSINOPHIL # BLD AUTO: 0.09 10*3/MM3 (ref 0–0.4)
EOSINOPHIL NFR BLD AUTO: 1.1 % (ref 0.3–6.2)
ERYTHROCYTE [DISTWIDTH] IN BLOOD BY AUTOMATED COUNT: 12.9 % (ref 12.3–15.4)
GLOBULIN UR ELPH-MCNC: 3.2 GM/DL
GLUCOSE SERPL-MCNC: 198 MG/DL (ref 65–99)
HCT VFR BLD AUTO: 38.7 % (ref 34–46.6)
HGB BLD-MCNC: 12.3 G/DL (ref 12–15.9)
IMM GRANULOCYTES # BLD AUTO: 0.04 10*3/MM3 (ref 0–0.05)
IMM GRANULOCYTES NFR BLD AUTO: 0.5 % (ref 0–0.5)
LIPASE SERPL-CCNC: 50 U/L (ref 13–60)
LYMPHOCYTES # BLD AUTO: 0.82 10*3/MM3 (ref 0.7–3.1)
LYMPHOCYTES NFR BLD AUTO: 10.4 % (ref 19.6–45.3)
MAGNESIUM SERPL-MCNC: 2.2 MG/DL (ref 1.6–2.4)
MCH RBC QN AUTO: 29.1 PG (ref 26.6–33)
MCHC RBC AUTO-ENTMCNC: 31.8 G/DL (ref 31.5–35.7)
MCV RBC AUTO: 91.5 FL (ref 79–97)
MONOCYTES # BLD AUTO: 0.32 10*3/MM3 (ref 0.1–0.9)
MONOCYTES NFR BLD AUTO: 4.1 % (ref 5–12)
NEUTROPHILS NFR BLD AUTO: 6.55 10*3/MM3 (ref 1.7–7)
NEUTROPHILS NFR BLD AUTO: 83.3 % (ref 42.7–76)
NRBC BLD AUTO-RTO: 0 /100 WBC (ref 0–0.2)
NT-PROBNP SERPL-MCNC: 438 PG/ML (ref 0–1800)
PLATELET # BLD AUTO: 195 10*3/MM3 (ref 140–450)
PMV BLD AUTO: 11.3 FL (ref 6–12)
POTASSIUM SERPL-SCNC: 4 MMOL/L (ref 3.5–5.2)
PROT SERPL-MCNC: 7.1 G/DL (ref 6–8.5)
RBC # BLD AUTO: 4.23 10*6/MM3 (ref 3.77–5.28)
SODIUM SERPL-SCNC: 139 MMOL/L (ref 136–145)
TROPONIN T SERPL HS-MCNC: 15 NG/L
WBC NRBC COR # BLD: 7.87 10*3/MM3 (ref 3.4–10.8)

## 2023-10-05 PROCEDURE — 71275 CT ANGIOGRAPHY CHEST: CPT

## 2023-10-05 PROCEDURE — 99285 EMERGENCY DEPT VISIT HI MDM: CPT

## 2023-10-05 PROCEDURE — 84484 ASSAY OF TROPONIN QUANT: CPT | Performed by: EMERGENCY MEDICINE

## 2023-10-05 PROCEDURE — 74176 CT ABD & PELVIS W/O CONTRAST: CPT

## 2023-10-05 PROCEDURE — 83735 ASSAY OF MAGNESIUM: CPT | Performed by: EMERGENCY MEDICINE

## 2023-10-05 PROCEDURE — 85610 PROTHROMBIN TIME: CPT | Performed by: EMERGENCY MEDICINE

## 2023-10-05 PROCEDURE — 85379 FIBRIN DEGRADATION QUANT: CPT | Performed by: EMERGENCY MEDICINE

## 2023-10-05 PROCEDURE — 80053 COMPREHEN METABOLIC PANEL: CPT | Performed by: EMERGENCY MEDICINE

## 2023-10-05 PROCEDURE — 25010000002 MORPHINE PER 10 MG: Performed by: EMERGENCY MEDICINE

## 2023-10-05 PROCEDURE — 85025 COMPLETE CBC W/AUTO DIFF WBC: CPT | Performed by: EMERGENCY MEDICINE

## 2023-10-05 PROCEDURE — 83690 ASSAY OF LIPASE: CPT | Performed by: EMERGENCY MEDICINE

## 2023-10-05 PROCEDURE — 83605 ASSAY OF LACTIC ACID: CPT | Performed by: EMERGENCY MEDICINE

## 2023-10-05 PROCEDURE — 85730 THROMBOPLASTIN TIME PARTIAL: CPT | Performed by: EMERGENCY MEDICINE

## 2023-10-05 PROCEDURE — 93005 ELECTROCARDIOGRAM TRACING: CPT

## 2023-10-05 PROCEDURE — 25010000002 ONDANSETRON PER 1 MG: Performed by: EMERGENCY MEDICINE

## 2023-10-05 PROCEDURE — 93005 ELECTROCARDIOGRAM TRACING: CPT | Performed by: EMERGENCY MEDICINE

## 2023-10-05 PROCEDURE — 82565 ASSAY OF CREATININE: CPT

## 2023-10-05 PROCEDURE — 83880 ASSAY OF NATRIURETIC PEPTIDE: CPT | Performed by: EMERGENCY MEDICINE

## 2023-10-05 PROCEDURE — 25510000001 IOPAMIDOL PER 1 ML: Performed by: EMERGENCY MEDICINE

## 2023-10-05 RX ORDER — MORPHINE SULFATE 2 MG/ML
2 INJECTION, SOLUTION INTRAMUSCULAR; INTRAVENOUS ONCE
Status: COMPLETED | OUTPATIENT
Start: 2023-10-05 | End: 2023-10-05

## 2023-10-05 RX ORDER — SODIUM CHLORIDE 0.9 % (FLUSH) 0.9 %
10 SYRINGE (ML) INJECTION AS NEEDED
Status: DISCONTINUED | OUTPATIENT
Start: 2023-10-05 | End: 2023-10-06 | Stop reason: SDUPTHER

## 2023-10-05 RX ORDER — ONDANSETRON 2 MG/ML
4 INJECTION INTRAMUSCULAR; INTRAVENOUS ONCE
Status: COMPLETED | OUTPATIENT
Start: 2023-10-05 | End: 2023-10-05

## 2023-10-05 RX ORDER — ASPIRIN 81 MG/1
324 TABLET, CHEWABLE ORAL ONCE
Status: DISCONTINUED | OUTPATIENT
Start: 2023-10-05 | End: 2023-10-05

## 2023-10-05 RX ADMIN — IOPAMIDOL 75 ML: 755 INJECTION, SOLUTION INTRAVENOUS at 22:52

## 2023-10-05 RX ADMIN — ONDANSETRON 4 MG: 2 INJECTION INTRAMUSCULAR; INTRAVENOUS at 22:58

## 2023-10-05 RX ADMIN — MORPHINE SULFATE 2 MG: 2 INJECTION, SOLUTION INTRAMUSCULAR; INTRAVENOUS at 23:07

## 2023-10-06 ENCOUNTER — APPOINTMENT (OUTPATIENT)
Dept: CT IMAGING | Facility: HOSPITAL | Age: 83
DRG: 069 | End: 2023-10-06
Payer: MEDICARE

## 2023-10-06 ENCOUNTER — APPOINTMENT (OUTPATIENT)
Dept: MRI IMAGING | Facility: HOSPITAL | Age: 83
DRG: 069 | End: 2023-10-06
Payer: MEDICARE

## 2023-10-06 ENCOUNTER — APPOINTMENT (OUTPATIENT)
Dept: CARDIOLOGY | Facility: HOSPITAL | Age: 83
DRG: 069 | End: 2023-10-06
Payer: MEDICARE

## 2023-10-06 PROBLEM — I16.0 HYPERTENSIVE URGENCY: Status: ACTIVE | Noted: 2023-10-06

## 2023-10-06 LAB
ALBUMIN SERPL-MCNC: 4 G/DL (ref 3.5–5.2)
ALBUMIN/GLOB SERPL: 1.2 G/DL
ALP SERPL-CCNC: 90 U/L (ref 39–117)
ALT SERPL W P-5'-P-CCNC: 10 U/L (ref 1–33)
ANION GAP SERPL CALCULATED.3IONS-SCNC: 10 MMOL/L (ref 5–15)
APTT PPP: 28.9 SECONDS (ref 22–39)
AST SERPL-CCNC: 15 U/L (ref 1–32)
BASOPHILS # BLD AUTO: 0.04 10*3/MM3 (ref 0–0.2)
BASOPHILS NFR BLD AUTO: 0.5 % (ref 0–1.5)
BILIRUB SERPL-MCNC: 0.2 MG/DL (ref 0–1.2)
BILIRUB UR QL STRIP: NEGATIVE
BUN SERPL-MCNC: 16 MG/DL (ref 8–23)
BUN/CREAT SERPL: 22.2 (ref 7–25)
CALCIUM SPEC-SCNC: 9.6 MG/DL (ref 8.6–10.5)
CHLORIDE SERPL-SCNC: 104 MMOL/L (ref 98–107)
CHOLEST SERPL-MCNC: 185 MG/DL (ref 0–200)
CLARITY UR: CLEAR
CO2 SERPL-SCNC: 27 MMOL/L (ref 22–29)
COLOR UR: YELLOW
CREAT SERPL-MCNC: 0.72 MG/DL (ref 0.57–1)
D DIMER PPP FEU-MCNC: 0.91 MCGFEU/ML (ref 0–0.83)
DEPRECATED RDW RBC AUTO: 42.9 FL (ref 37–54)
EGFRCR SERPLBLD CKD-EPI 2021: 83.1 ML/MIN/1.73
EOSINOPHIL # BLD AUTO: 0.01 10*3/MM3 (ref 0–0.4)
EOSINOPHIL NFR BLD AUTO: 0.1 % (ref 0.3–6.2)
ERYTHROCYTE [DISTWIDTH] IN BLOOD BY AUTOMATED COUNT: 12.9 % (ref 12.3–15.4)
GEN 5 2HR TROPONIN T REFLEX: 15 NG/L
GLOBULIN UR ELPH-MCNC: 3.3 GM/DL
GLUCOSE BLDC GLUCOMTR-MCNC: 130 MG/DL (ref 70–130)
GLUCOSE BLDC GLUCOMTR-MCNC: 199 MG/DL (ref 70–130)
GLUCOSE SERPL-MCNC: 133 MG/DL (ref 65–99)
GLUCOSE UR STRIP-MCNC: ABNORMAL MG/DL
HBA1C MFR BLD: 5.2 % (ref 4.8–5.6)
HCT VFR BLD AUTO: 40.6 % (ref 34–46.6)
HDLC SERPL-MCNC: 74 MG/DL (ref 40–60)
HGB BLD-MCNC: 13.1 G/DL (ref 12–15.9)
HGB UR QL STRIP.AUTO: NEGATIVE
HOLD SPECIMEN: NORMAL
IMM GRANULOCYTES # BLD AUTO: 0.03 10*3/MM3 (ref 0–0.05)
IMM GRANULOCYTES NFR BLD AUTO: 0.4 % (ref 0–0.5)
INR PPP: 0.98 (ref 0.89–1.12)
KETONES UR QL STRIP: NEGATIVE
LDLC SERPL CALC-MCNC: 97 MG/DL (ref 0–100)
LDLC/HDLC SERPL: 1.3 {RATIO}
LEUKOCYTE ESTERASE UR QL STRIP.AUTO: NEGATIVE
LYMPHOCYTES # BLD AUTO: 0.88 10*3/MM3 (ref 0.7–3.1)
LYMPHOCYTES NFR BLD AUTO: 11.7 % (ref 19.6–45.3)
MAGNESIUM SERPL-MCNC: 2.3 MG/DL (ref 1.6–2.4)
MCH RBC QN AUTO: 29.5 PG (ref 26.6–33)
MCHC RBC AUTO-ENTMCNC: 32.3 G/DL (ref 31.5–35.7)
MCV RBC AUTO: 91.4 FL (ref 79–97)
MONOCYTES # BLD AUTO: 0.28 10*3/MM3 (ref 0.1–0.9)
MONOCYTES NFR BLD AUTO: 3.7 % (ref 5–12)
NEUTROPHILS NFR BLD AUTO: 6.28 10*3/MM3 (ref 1.7–7)
NEUTROPHILS NFR BLD AUTO: 83.6 % (ref 42.7–76)
NITRITE UR QL STRIP: NEGATIVE
NRBC BLD AUTO-RTO: 0 /100 WBC (ref 0–0.2)
PH UR STRIP.AUTO: 7.5 [PH] (ref 5–8)
PLATELET # BLD AUTO: 217 10*3/MM3 (ref 140–450)
PMV BLD AUTO: 11.6 FL (ref 6–12)
POTASSIUM SERPL-SCNC: 3.8 MMOL/L (ref 3.5–5.2)
PROT SERPL-MCNC: 7.3 G/DL (ref 6–8.5)
PROT UR QL STRIP: NEGATIVE
PROTHROMBIN TIME: 13.1 SECONDS (ref 12.2–14.5)
RBC # BLD AUTO: 4.44 10*6/MM3 (ref 3.77–5.28)
SODIUM SERPL-SCNC: 141 MMOL/L (ref 136–145)
SP GR UR STRIP: 1.03 (ref 1–1.03)
TRIGL SERPL-MCNC: 75 MG/DL (ref 0–150)
TROPONIN T DELTA: 0 NG/L
TROPONIN T SERPL HS-MCNC: 14 NG/L
TSH SERPL DL<=0.05 MIU/L-ACNC: 0.86 UIU/ML (ref 0.27–4.2)
UROBILINOGEN UR QL STRIP: ABNORMAL
VLDLC SERPL-MCNC: 14 MG/DL (ref 5–40)
WBC NRBC COR # BLD: 7.52 10*3/MM3 (ref 3.4–10.8)
WHOLE BLOOD HOLD COAG: NORMAL
WHOLE BLOOD HOLD SPECIMEN: NORMAL

## 2023-10-06 PROCEDURE — 81003 URINALYSIS AUTO W/O SCOPE: CPT | Performed by: EMERGENCY MEDICINE

## 2023-10-06 PROCEDURE — 36415 COLL VENOUS BLD VENIPUNCTURE: CPT

## 2023-10-06 PROCEDURE — G0378 HOSPITAL OBSERVATION PER HR: HCPCS

## 2023-10-06 PROCEDURE — 84443 ASSAY THYROID STIM HORMONE: CPT | Performed by: INTERNAL MEDICINE

## 2023-10-06 PROCEDURE — 0042T HC CT CEREBRAL PERFUSION W/WO CONTRAST: CPT

## 2023-10-06 PROCEDURE — 93325 DOPPLER ECHO COLOR FLOW MAPG: CPT

## 2023-10-06 PROCEDURE — 4A03X5D MEASUREMENT OF ARTERIAL FLOW, INTRACRANIAL, EXTERNAL APPROACH: ICD-10-PCS | Performed by: RADIOLOGY

## 2023-10-06 PROCEDURE — 25510000001 IOPAMIDOL PER 1 ML: Performed by: EMERGENCY MEDICINE

## 2023-10-06 PROCEDURE — 70496 CT ANGIOGRAPHY HEAD: CPT

## 2023-10-06 PROCEDURE — 25810000003 SODIUM CHLORIDE 0.9 % SOLUTION 250 ML FLEX CONT: Performed by: EMERGENCY MEDICINE

## 2023-10-06 PROCEDURE — 70498 CT ANGIOGRAPHY NECK: CPT

## 2023-10-06 PROCEDURE — 70551 MRI BRAIN STEM W/O DYE: CPT

## 2023-10-06 PROCEDURE — 99223 1ST HOSP IP/OBS HIGH 75: CPT

## 2023-10-06 PROCEDURE — 70450 CT HEAD/BRAIN W/O DYE: CPT

## 2023-10-06 PROCEDURE — 84484 ASSAY OF TROPONIN QUANT: CPT | Performed by: EMERGENCY MEDICINE

## 2023-10-06 PROCEDURE — 92610 EVALUATE SWALLOWING FUNCTION: CPT

## 2023-10-06 PROCEDURE — 80053 COMPREHEN METABOLIC PANEL: CPT | Performed by: INTERNAL MEDICINE

## 2023-10-06 PROCEDURE — 80061 LIPID PANEL: CPT | Performed by: INTERNAL MEDICINE

## 2023-10-06 PROCEDURE — 93321 DOPPLER ECHO F-UP/LMTD STD: CPT

## 2023-10-06 PROCEDURE — 93005 ELECTROCARDIOGRAM TRACING: CPT | Performed by: EMERGENCY MEDICINE

## 2023-10-06 PROCEDURE — 82948 REAGENT STRIP/BLOOD GLUCOSE: CPT

## 2023-10-06 PROCEDURE — 25010000002 MORPHINE PER 10 MG: Performed by: EMERGENCY MEDICINE

## 2023-10-06 PROCEDURE — 93321 DOPPLER ECHO F-UP/LMTD STD: CPT | Performed by: INTERNAL MEDICINE

## 2023-10-06 PROCEDURE — 97165 OT EVAL LOW COMPLEX 30 MIN: CPT

## 2023-10-06 PROCEDURE — 92523 SPEECH SOUND LANG COMPREHEN: CPT

## 2023-10-06 PROCEDURE — 93308 TTE F-UP OR LMTD: CPT

## 2023-10-06 PROCEDURE — 84484 ASSAY OF TROPONIN QUANT: CPT | Performed by: INTERNAL MEDICINE

## 2023-10-06 PROCEDURE — 97535 SELF CARE MNGMENT TRAINING: CPT

## 2023-10-06 PROCEDURE — 85025 COMPLETE CBC W/AUTO DIFF WBC: CPT | Performed by: INTERNAL MEDICINE

## 2023-10-06 PROCEDURE — 93325 DOPPLER ECHO COLOR FLOW MAPG: CPT | Performed by: INTERNAL MEDICINE

## 2023-10-06 PROCEDURE — 83036 HEMOGLOBIN GLYCOSYLATED A1C: CPT | Performed by: INTERNAL MEDICINE

## 2023-10-06 PROCEDURE — 93308 TTE F-UP OR LMTD: CPT | Performed by: INTERNAL MEDICINE

## 2023-10-06 PROCEDURE — 83735 ASSAY OF MAGNESIUM: CPT | Performed by: INTERNAL MEDICINE

## 2023-10-06 RX ORDER — ACETAMINOPHEN 325 MG/1
650 TABLET ORAL EVERY 4 HOURS PRN
Status: DISCONTINUED | OUTPATIENT
Start: 2023-10-06 | End: 2023-10-10 | Stop reason: HOSPADM

## 2023-10-06 RX ORDER — SODIUM CHLORIDE 9 MG/ML
40 INJECTION, SOLUTION INTRAVENOUS AS NEEDED
Status: DISCONTINUED | OUTPATIENT
Start: 2023-10-06 | End: 2023-10-10 | Stop reason: HOSPADM

## 2023-10-06 RX ORDER — ASPIRIN 81 MG/1
81 TABLET ORAL DAILY
Status: DISCONTINUED | OUTPATIENT
Start: 2023-10-06 | End: 2023-10-10 | Stop reason: HOSPADM

## 2023-10-06 RX ORDER — ONDANSETRON 2 MG/ML
4 INJECTION INTRAMUSCULAR; INTRAVENOUS EVERY 6 HOURS PRN
Status: DISCONTINUED | OUTPATIENT
Start: 2023-10-06 | End: 2023-10-10 | Stop reason: HOSPADM

## 2023-10-06 RX ORDER — LABETALOL HYDROCHLORIDE 5 MG/ML
10 INJECTION, SOLUTION INTRAVENOUS ONCE
Status: COMPLETED | OUTPATIENT
Start: 2023-10-06 | End: 2023-10-06

## 2023-10-06 RX ORDER — SODIUM CHLORIDE 0.9 % (FLUSH) 0.9 %
10 SYRINGE (ML) INJECTION AS NEEDED
Status: DISCONTINUED | OUTPATIENT
Start: 2023-10-06 | End: 2023-10-06 | Stop reason: SDUPTHER

## 2023-10-06 RX ORDER — MORPHINE SULFATE 2 MG/ML
2 INJECTION, SOLUTION INTRAMUSCULAR; INTRAVENOUS ONCE
Status: COMPLETED | OUTPATIENT
Start: 2023-10-06 | End: 2023-10-06

## 2023-10-06 RX ORDER — CLONIDINE HYDROCHLORIDE 0.1 MG/1
0.1 TABLET ORAL ONCE
Status: COMPLETED | OUTPATIENT
Start: 2023-10-06 | End: 2023-10-06

## 2023-10-06 RX ORDER — SODIUM CHLORIDE 0.9 % (FLUSH) 0.9 %
10 SYRINGE (ML) INJECTION AS NEEDED
Status: DISCONTINUED | OUTPATIENT
Start: 2023-10-06 | End: 2023-10-10 | Stop reason: HOSPADM

## 2023-10-06 RX ORDER — ATORVASTATIN CALCIUM 40 MG/1
80 TABLET, FILM COATED ORAL NIGHTLY
Status: DISCONTINUED | OUTPATIENT
Start: 2023-10-06 | End: 2023-10-10 | Stop reason: HOSPADM

## 2023-10-06 RX ORDER — SODIUM CHLORIDE 0.9 % (FLUSH) 0.9 %
10 SYRINGE (ML) INJECTION EVERY 12 HOURS SCHEDULED
Status: DISCONTINUED | OUTPATIENT
Start: 2023-10-06 | End: 2023-10-10 | Stop reason: HOSPADM

## 2023-10-06 RX ORDER — SODIUM CHLORIDE 0.9 % (FLUSH) 0.9 %
10 SYRINGE (ML) INJECTION EVERY 12 HOURS SCHEDULED
Status: DISCONTINUED | OUTPATIENT
Start: 2023-10-06 | End: 2023-10-06 | Stop reason: SDUPTHER

## 2023-10-06 RX ORDER — SODIUM CHLORIDE 9 MG/ML
40 INJECTION, SOLUTION INTRAVENOUS AS NEEDED
Status: DISCONTINUED | OUTPATIENT
Start: 2023-10-06 | End: 2023-10-06 | Stop reason: SDUPTHER

## 2023-10-06 RX ORDER — HYDROCODONE BITARTRATE AND ACETAMINOPHEN 5; 325 MG/1; MG/1
1 TABLET ORAL EVERY 4 HOURS PRN
Status: DISCONTINUED | OUTPATIENT
Start: 2023-10-06 | End: 2023-10-10 | Stop reason: HOSPADM

## 2023-10-06 RX ORDER — HYDRALAZINE HYDROCHLORIDE 25 MG/1
25 TABLET, FILM COATED ORAL EVERY 8 HOURS PRN
Status: DISCONTINUED | OUTPATIENT
Start: 2023-10-06 | End: 2023-10-06

## 2023-10-06 RX ORDER — NITROGLYCERIN 0.4 MG/1
0.4 TABLET SUBLINGUAL
Status: DISCONTINUED | OUTPATIENT
Start: 2023-10-06 | End: 2023-10-10 | Stop reason: HOSPADM

## 2023-10-06 RX ORDER — METOPROLOL SUCCINATE 50 MG/1
50 TABLET, EXTENDED RELEASE ORAL DAILY
Status: DISCONTINUED | OUTPATIENT
Start: 2023-10-06 | End: 2023-10-10 | Stop reason: HOSPADM

## 2023-10-06 RX ADMIN — METOPROLOL SUCCINATE 50 MG: 50 TABLET, EXTENDED RELEASE ORAL at 08:06

## 2023-10-06 RX ADMIN — ASPIRIN 81 MG: 81 TABLET, COATED ORAL at 08:06

## 2023-10-06 RX ADMIN — Medication 10 ML: at 08:05

## 2023-10-06 RX ADMIN — Medication 10 MG: at 01:03

## 2023-10-06 RX ADMIN — IOPAMIDOL 115 ML: 755 INJECTION, SOLUTION INTRAVENOUS at 02:08

## 2023-10-06 RX ADMIN — MORPHINE SULFATE 2 MG: 2 INJECTION, SOLUTION INTRAMUSCULAR; INTRAVENOUS at 02:40

## 2023-10-06 RX ADMIN — ATORVASTATIN CALCIUM 80 MG: 40 TABLET, FILM COATED ORAL at 03:34

## 2023-10-06 RX ADMIN — HYDROCODONE BITARTRATE AND ACETAMINOPHEN 1 TABLET: 5; 325 TABLET ORAL at 03:34

## 2023-10-06 RX ADMIN — ATORVASTATIN CALCIUM 80 MG: 40 TABLET, FILM COATED ORAL at 20:21

## 2023-10-06 RX ADMIN — HYDROCODONE BITARTRATE AND ACETAMINOPHEN 1 TABLET: 5; 325 TABLET ORAL at 20:21

## 2023-10-06 RX ADMIN — CLONIDINE HYDROCHLORIDE 0.1 MG: 0.1 TABLET ORAL at 02:40

## 2023-10-06 RX ADMIN — NICARDIPINE HYDROCHLORIDE 5 MG/HR: 25 INJECTION, SOLUTION INTRAVENOUS at 02:39

## 2023-10-06 RX ADMIN — NITROGLYCERIN 1 INCH: 20 OINTMENT TOPICAL at 01:03

## 2023-10-06 RX ADMIN — Medication 10 ML: at 02:40

## 2023-10-06 RX ADMIN — ACETAMINOPHEN 650 MG: 325 TABLET ORAL at 23:14

## 2023-10-06 RX ADMIN — HYDROCODONE BITARTRATE AND ACETAMINOPHEN 1 TABLET: 5; 325 TABLET ORAL at 14:57

## 2023-10-06 NOTE — H&P
"    [facility] Medicine Services  HISTORY AND PHYSICAL    Patient Name: Aretha Angela  : 1940  MRN: [id number]  Primary Care Physician: Kt Heredia MD  Date of admission: 10/5/2023      Subjective   Subjective     Chief Complaint:  Neck pain    HPI:  Aretha Angela is a 83 y.o. female who states that she was eating dinner earlier tonight (Thursday) around 7 PM and noticed onset of neck pain which she describes as \"pretty bad,\" somewhat sharp, simultaneous pain \"toward both my shoulders and into my back.\"  She states this was present for a few minutes and then resolved.  She went home but still had some persistent low-level neck pain during the evening.  The patient has a history of a bioprosthetic aortic valve placement, and because of this history, as well as the onset of the neck/shoulder/back pain, she became concerned, and therefore came to the ED.  While in the ED, the ED physician states that on exam she thought the patient had right upper extremity weakness, and requested admission for stroke work-up.  The patient does have a history of hypertension but states she does not take her medications every day and admits that, \"I need to get better about that.\"  She has had some systolic pressures in the ED as high as the 220s.  She denies chest pain, shortness of breath, fever/chills, nausea/emesis, abdominal pain, bowel habit change, slurred speech/facial droop, focal weakness, visual changes, headache, or syncope.  Her medical history is significant for hypertension (with some medication noncompliance as above), and the aforementioned aortic bioprosthetic valve which was placed in , per prior records.  Also per prior records, history of hyperlipidemia as mentioned, but the patient states she does not recall ever being diagnosed with that.        Personal History     Past Medical History:   Diagnosis Date    Chronic gastric ulcer     Dysphagia     GERD " (gastroesophageal reflux disease)     Hypertension     Nausea              Past Surgical History:   Procedure Laterality Date    AORTIC VALVE REPAIR/REPLACEMENT      HYSTERECTOMY      KNEE ARTHROSCOPY         Family History: The patient states that she was adopted and therefore is not certain of her parents/family medical history.    Social History:  reports that she has quit smoking. She has never used smokeless tobacco. She reports current alcohol use of about 2.0 standard drinks per week. She reports that she does not use drugs.  She plays tennis almost every day, and works out in the gym daily.  Social History     Social History Narrative    Not on file       Medications:  Available home medication information reviewed.  (Not in a hospital admission)      No Known Allergies    Objective   Objective     Vital Signs:   Temp:  [98 øF (36.7 øC)] 98 øF (36.7 øC)  Heart Rate:  [68-81] 80  Resp:  [16] 16  BP: (160-221)/() 188/94  Total (NIH Stroke Scale): 0    Physical Exam   Constitutional: Awake, alert, NAD, very pleasant.  Eyes: PERRLA, sclerae anicteric, no conjunctival injection  HENT: NCAT, mucous membranes moist  Neck: Supple, no thyromegaly, no lymphadenopathy, trachea midline  Respiratory: Clear to auscultation bilaterally, nonlabored respirations   Cardiovascular: RRR, 3/6 systolic murmur, no rubs or gallops, palpable pedal pulses bilaterally  Gastrointestinal: Positive bowel sounds, soft, nontender, nondistended  Musculoskeletal: No bilateral ankle edema, no clubbing or cyanosis to extremities  Psychiatric: Appropriate affect, cooperative  Neurologic: Oriented x 3, strength symmetric in all extremities, there is no upper or lower extremity weakness on my exam, Cranial Nerves grossly intact to confrontation, speech clear  Skin: No rashes, normal turgor.    Result Review:  I have personally reviewed the results from the time of this admission to 10/6/2023 03:03 EDT and agree with these findings:  [x]   Laboratory list / accordion  []  Microbiology  [x]  Radiology  [x]  EKG/Telemetry   []  Cardiology/Vascular   []  Pathology  []  Old records  []  Other:  Most notable findings include: I reviewed EKG which by my read shows normal sinus rhythm, ventricular rate approximately 75 bpm, left axis, nonspecific ST/T wave changes but no acute appearing ST elevation.        LAB RESULTS:      Lab 10/05/23  2328   WBC 7.87   HEMOGLOBIN 12.3   HEMATOCRIT 38.7   PLATELETS 195   NEUTROS ABS 6.55   IMMATURE GRANS (ABS) 0.04   LYMPHS ABS 0.82   MONOS ABS 0.32   EOS ABS 0.09   MCV 91.5   LACTATE 1.3   PROTIME 13.1   INR 0.98   APTT 28.9   D DIMER QUANT 0.91*         Lab 10/06/23  0138 10/05/23  2328   SODIUM  --  139   POTASSIUM  --  4.0   CHLORIDE  --  106   CO2  --  25.0   ANION GAP  --  8.0   BUN  --  18   CREATININE  --  0.75   EGFR  --  79.1   GLUCOSE  --  198*   CALCIUM  --  9.3   MAGNESIUM  --  2.2   TSH 0.856  --          Lab 10/05/23  2328   TOTAL PROTEIN 7.1   ALBUMIN 3.9   GLOBULIN 3.2   ALT (SGPT) 9   AST (SGOT) 16   BILIRUBIN 0.2   ALK PHOS 86   LIPASE 50         Lab 10/06/23  0138 10/05/23  2328   PROBNP  --  438.0   HSTROP T 15* 15*                 UA          10/6/2023    00:08   Urinalysis   Specific Gravity, UA 1.034    Ketones, UA Negative    Blood, UA Negative    Leukocytes, UA Negative    Nitrite, UA Negative        Microbiology Results (last 10 days)       ** No results found for the last 240 hours. **            CT Abdomen Pelvis Without Contrast    Result Date: 10/5/2023  CT ABDOMEN PELVIS WO CONTRAST Date of Exam: 10/5/2023 10:39 PM EDT Indication: Nausea/vomiting. Comparison: None available. Technique: Axial CT images were obtained of the abdomen and pelvis without the administration of contrast. Reconstructed coronal and sagittal images were also obtained. Automated exposure control and iterative construction methods were used. FINDINGS: The lung bases are clear without evidence for focal consolidation or  significant pleural effusion. A small hiatal hernia is noted. Associated reflux is observed. The liver, spleen, and pancreas are unremarkable without contrast. The gallbladder is decompressed. No biliary dilatation is seen. The bilateral adrenal glands are symmetric and unremarkable without contrast. No definitive nephrolithiasis is seen bilaterally. There is no hydronephrosis or hydroureter. There is no evidence for obstructive uropathy. No stones are seen in the bladder. Bilateral renal cysts are noted. There is no bowel dilatation or obstruction. There is no evidence for acute appendicitis. There is a fluid density focus within the right abdomen adjacent to the proximal ascending colon. This finding may be related to a mesenchymal cyst or enteric duplication cyst. This finding measures approximately 5 cm. No significant free fluid is observed. No significant lymphadenopathy is seen throughout the abdomen or pelvis. The bladder is partially decompressed. No acute osseous abnormalities are observed.     Impression: 1.No evidence for significant nephrolithiasis. There is no evidence for obstructive uropathy. 2.No evidence for acute abnormality throughout the abdomen or pelvis on this noncontrast exam. 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 4.There is a small hiatal hernia. There is associated reflux. Electronically Signed: Leandro Chávez MD  10/5/2023 10:56 PM EDT  Workstation ID: [id number]    CT Angiogram Neck    Result Date: 10/6/2023  CT ANGIOGRAM NECK, CT ANGIOGRAM HEAD W AI ANALYSIS OF LVO Date of Exam: 10/6/2023 2:01 AM EDT Indication: Neuro deficit, acute stroke suspected. Comparison: None available. Technique: CTA of the neck was performed before and after the uneventful intravenous administration of 115 mL Isovue-370. Reconstructed coronal and sagittal images were also obtained. In addition, a  3-D volume rendered image was created for interpretation. Automated exposure control and iterative reconstruction methods were used. Findings: Aortic arch: The aortic arch is unremarkable.  There is conventional 3 vessel arch anatomy.  The right brachiocephalic and visualized bilateral subclavian arteries are within normal limits. Right carotid: The right CCA arises as expected from the brachiocephalic trunk.  The CCA follows a normal course and appears normal caliber.  The carotid bifurcation is unremarkable.  The external carotid artery and distal branches appear within normal limits.  The cervical internal carotid artery follows a normal course and appears normal caliber throughout the neck and into the head.  The intracranial ICA segments appear within normal limits.  The ophthalmic artery origin is normal.  The A1 and M1 segments appear within normal limits.  The visualized distal RAMON and MCA branches appear patent.  There is  a patent  anterior communicating artery. There is a patent  posterior communicating artery. Left carotid: The left CCA arises as expected from the aortic arch.   The CCA follows a normal course and appears normal caliber.  The carotid bifurcation is unremarkable.  The external carotid artery and distal branches appear within normal limits.  The  cervical internal carotid artery follows a normal course and appears normal caliber throughout the neck and into the head.  The intracranial ICA segments appear within normal limits.  The ophthalmic artery origin is normal.  The A1 and M1 segments appear within normal limits.  The visualized distal RAMON and MCA branches appear patent.  There is a patent  posterior communicating artery. Posterior circulation: Vertebral arteries arise as expected from ipsilateral subclavian arteries. The left vertebral artery is dominant. The vertebral arteries follow a normal course and appear normal caliber throughout the neck and into the head.  The V4 segments  are patent.  Visualized posterior inferior cerebellar arteries are within normal limits.  The basilar artery is normal caliber.  Superior cerebellar arteries are patent.  Bilateral P1 segments and posterior cerebral arteries appear within normal limits. The prominence of the basilar artery suggested on CT is not felt to represent aneurysm. Nonvascular findings: There is no acute intracranial abnormality. There is atrophy and chronic microvascular ischemic change. Orbits are within normal limits.  Paranasal sinuses and mastoid air cells appear well aerated.  Superficial soft tissues and underlying musculature appear within normal limits.  The lung apices are clear.  The thyroid and salivary glands appear unremarkable.  The suprahyoid and infrahyoid spaces of the neck appear unremarkable.  There is no evidence of cervical lymphadenopathy  or a neck mass.  There are no acute osseous abnormalities or destructive bone lesions.     Impression: Impression: No acute large vessel occlusion, stenosis, aneurysm or vascular malformation. Electronically Signed: Ricco Wisdom MD  10/6/2023 2:32 AM EDT  Workstation ID: [id number]    CT Angiogram Chest    Result Date: 10/5/2023  CT ANGIOGRAM CHEST Date of Exam: 10/5/2023 10:46 PM EDT Indication: Chest pain, nonspecific. Comparison: None available. Technique: CTA of the chest was performed after the uneventful intravenous administration of 75 cc Isovue 370. Reconstructed coronal and sagittal images were also obtained. In addition, a 3-D volume rendered image was created for interpretation. Automated exposure control and iterative reconstruction methods were used. Findings: The thoracic inlet is unremarkable. There are no enlarged axillary lymph nodes . There is no vascular filling defects to suggest pulmonary emboli. There is no acute infiltrate. There is no pleural effusion. There is mild cephalization of the pulmonary vessels with smooth septal thickening suggestive of a mild  pulmonary vascular congestion. The heart is enlarged. There are calcifications of the aortic valve. There are no coronary artery calcifications. There is a simple cyst in the right kidney. There is a moderate-sized hiatal hernia containing a small portion of the stomach.     Impression: Impression: No vascular filling defect to suggest pulmonary emboli. Findings most suggestive of mild pulmonary vascular congestion. Cardiomegaly. Hiatal hernia. Electronically Signed: Gio Kevin MD  10/5/2023 11:02 PM EDT  Workstation ID: [id number]    CT Head Without Contrast Stroke Protocol    Result Date: 10/6/2023  CT HEAD WO CONTRAST STROKE PROTOCOL Date of Exam: 10/6/2023 1:56 AM EDT Indication: Neuro deficit, acute, stroke suspected Neuro deficit, acute stroke suspected. Comparison: None available. Technique: Axial CT images were obtained of the head without contrast administration.  Reconstructed coronal images were also obtained. Automated exposure control and iterative construction methods were used. Scan Time: October 6, 2023 at 0157 hours Results discussed with the stroke team representativeBen at 0205 hours. Findings: Contrast is seen within the vascular system from earlier exam. The basilar artery is prominent. Please refer to CT angiogram findings. There is mild diffuse generalized atrophy. There are low-attenuation areas in the periventricular white matter consistent with chronic microvascular ischemic change. There is no mass, mass effect or midline shift. There are no abnormal extra-axial fluid collections or areas of acute hemorrhage. The paranasal sinuses are clear. The mastoid air cells are clear.     Impression: Impression: Mild atrophy and chronic microvascular ischemia. No acute intracranial process. Electronically Signed: Ricco Wisdom MD  10/6/2023 2:06 AM EDT  Workstation ID: [id number]    CT Angiogram Head w AI Analysis of LVO    Result Date: 10/6/2023  CT ANGIOGRAM NECK, CT ANGIOGRAM HEAD W AI  ANALYSIS OF LVO Date of Exam: 10/6/2023 2:01 AM EDT Indication: Neuro deficit, acute stroke suspected. Comparison: None available. Technique: CTA of the neck was performed before and after the uneventful intravenous administration of 115 mL Isovue-370. Reconstructed coronal and sagittal images were also obtained. In addition, a 3-D volume rendered image was created for interpretation. Automated exposure control and iterative reconstruction methods were used. Findings: Aortic arch: The aortic arch is unremarkable.  There is conventional 3 vessel arch anatomy.  The right brachiocephalic and visualized bilateral subclavian arteries are within normal limits. Right carotid: The right CCA arises as expected from the brachiocephalic trunk.  The CCA follows a normal course and appears normal caliber.  The carotid bifurcation is unremarkable.  The external carotid artery and distal branches appear within normal limits.  The cervical internal carotid artery follows a normal course and appears normal caliber throughout the neck and into the head.  The intracranial ICA segments appear within normal limits.  The ophthalmic artery origin is normal.  The A1 and M1 segments appear within normal limits.  The visualized distal RAMON and MCA branches appear patent.  There is  a patent  anterior communicating artery. There is a patent  posterior communicating artery. Left carotid: The left CCA arises as expected from the aortic arch.   The CCA follows a normal course and appears normal caliber.  The carotid bifurcation is unremarkable.  The external carotid artery and distal branches appear within normal limits.  The  cervical internal carotid artery follows a normal course and appears normal caliber throughout the neck and into the head.  The intracranial ICA segments appear within normal limits.  The ophthalmic artery origin is normal.  The A1 and M1 segments appear within normal limits.  The visualized distal RAMON and MCA branches  appear patent.  There is a patent  posterior communicating artery. Posterior circulation: Vertebral arteries arise as expected from ipsilateral subclavian arteries. The left vertebral artery is dominant. The vertebral arteries follow a normal course and appear normal caliber throughout the neck and into the head.  The V4 segments are patent.  Visualized posterior inferior cerebellar arteries are within normal limits.  The basilar artery is normal caliber.  Superior cerebellar arteries are patent.  Bilateral P1 segments and posterior cerebral arteries appear within normal limits. The prominence of the basilar artery suggested on CT is not felt to represent aneurysm. Nonvascular findings: There is no acute intracranial abnormality. There is atrophy and chronic microvascular ischemic change. Orbits are within normal limits.  Paranasal sinuses and mastoid air cells appear well aerated.  Superficial soft tissues and underlying musculature appear within normal limits.  The lung apices are clear.  The thyroid and salivary glands appear unremarkable.  The suprahyoid and infrahyoid spaces of the neck appear unremarkable.  There is no evidence of cervical lymphadenopathy  or a neck mass.  There are no acute osseous abnormalities or destructive bone lesions.     Impression: Impression: No acute large vessel occlusion, stenosis, aneurysm or vascular malformation. Electronically Signed: Ricco Wisdom MD  10/6/2023 2:32 AM EDT  Workstation ID: [id number]    CT CEREBRAL PERFUSION WITH & WITHOUT CONTRAST    Result Date: 10/6/2023  CT CEREBRAL PERFUSION W WO CONTRAST Date of Exam: 10/6/2023 2:01 AM EDT Indication: Neuro deficit, acute stroke suspected.  Comparison: None available. Technique: Axial CT images of the brain were obtained prior to and after the administration of 115 mL Isovue-370. Core blood volume, core blood flow, mean transit time, and Tmax images were obtained utilizing the Rapid software protocol. A limited CT  "angiogram of the head was also performed to measure the blood vessel density. The radiation dose reduction device was turned on for each scan per the ALARA (As Low as Reasonably Achievable) protocol. Findings: Total hypoperfusion: Using a Tmax threshold of greater than 6 seconds, there is no evidence of hypoperfusion. Core infarct: Using a threshold of cerebral blood flow less than 30%, there is no evidence of core infarct. Ischemic penumbra: There is no evidence of ischemic penumbra     Impression: Impression: Normal CT perfusion Electronically Signed: Ricco Wisdom MD  10/6/2023 2:26 AM EDT  Workstation ID: [id number]     Results for orders placed during the hospital encounter of 06/30/22    Adult Transthoracic Echo Complete W/ Cont if Necessary Per Protocol    Interpretation Summary  ú Calculated left ventricular EF = 62.6%  ú Left ventricular wall thickness is consistent with moderate to severe septal asymmetric hypertrophy. Sigmoid-shaped ventricular septum is present  ú No LVOT obstruction.  ú Mild aortic valve stenosis is present. Mean gradient 23 mmHg dimensionless index 0.30. Relatively stable findings compared with 2020 echo  ú There is mild paravalvular aortic regurgitation noted. Pressure half-time 633 ms.      Assessment & Plan   Assessment & Plan     Active Hospital Problems    Diagnosis  POA    **Hypertensive urgency [I16.0]  Yes       83F with uncontrolled hypertension/hypertensive urgency    Hypertensive urgency  - She admits to medical noncompliance, \"not taking my medications every day.\"  - Continue Cardene drip started in the ED.  - In June of last year there is a cardiology appointment where she is shown to be taking metoprolol succinate 50 mg p.o. every 24 hours, and the patient states that she \"thinks that is right.\"  We will resume this medication and wean off Cardene.  - Continue aspirin from home regimen, she will also receive daily statin.  - Check a.m. troponin.  - Continue " telemetry.    Neck/back/shoulder pain  - May be secondary to uncontrolled hypertension.  - She is undergoing CVA work-up so we will await MRI brain, 2D echo, evaluations from PT/OT/SLP, check HbA1c, lipid panel, TSH.  - Continue statin from home regimen, will also receive daily statin.  - Neuro checks.  - We will await and follow-up neurology service recommendations.    History of aortic bioprosthetic valve placed in 2010, due to severe aortic stenosis  - She states she is followed for this as an outpatient by cardiologist here.  - Continue telemetry.  - A.m. troponin.        Total time spent: 82 minutes  Time spent includes time reviewing chart, face-to-face time, counseling patient/family/caregiver, ordering medications/tests/procedures, communicating with other health care professionals, documenting clinical information in the electronic health record, and coordination of care.     DVT prophylaxis:  scds      CODE STATUS:  full  Code Status and Medical Interventions:   Ordered at: 10/06/23 0230     Level Of Support Discussed With:    Patient     Code Status (Patient has no pulse and is not breathing):    CPR (Attempt to Resuscitate)     Medical Interventions (Patient has pulse or is breathing):    Full Support       Expected Discharge tbmiles Rogers III, DO  10/06/23

## 2023-10-06 NOTE — PLAN OF CARE
Goal Outcome Evaluation:  Plan of Care Reviewed With: patient        Progress: no change  Outcome Evaluation: OT evaluation completed. Pt presents below her functional baseline with generalized weakness, decreased activity tolerance, balance deficits, and decreased safety awareness. Pt ambulated to/from the bathroom using a RWx with Johnny. Pt reported dizziness during ambulation, BP taken and found 102/64. ADLs completed with CGA/Johnny for safety. Pt will benefit from continued OT services during admission to increase safety and independence during ADLs and functional mobility. Recommend a d/c to IRF given pt's PLOF and home set up.      Anticipated Discharge Disposition (OT): inpatient rehabilitation facility

## 2023-10-06 NOTE — PLAN OF CARE
Problem: Fall Injury Risk  Goal: Absence of Fall and Fall-Related Injury  Outcome: Ongoing, Progressing  Intervention: Identify and Manage Contributors  Recent Flowsheet Documentation  Taken 10/6/2023 1631 by Venecia Willis RN  Self-Care Promotion: independence encouraged  Taken 10/6/2023 1458 by Venecia Willis RN  Self-Care Promotion: independence encouraged  Taken 10/6/2023 1230 by Venecia Willis RN  Self-Care Promotion: independence encouraged  Taken 10/6/2023 1020 by Venecia Willis RN  Self-Care Promotion: independence encouraged  Taken 10/6/2023 0805 by Venecia Willis RN  Medication Review/Management: medications reviewed  Self-Care Promotion: independence encouraged  Intervention: Promote Injury-Free Environment  Recent Flowsheet Documentation  Taken 10/6/2023 1631 by Venecia Willis RN  Safety Promotion/Fall Prevention:   activity supervised   assistive device/personal items within reach   safety round/check completed  Taken 10/6/2023 1458 by Venecia Willis RN  Safety Promotion/Fall Prevention:   activity supervised   assistive device/personal items within reach   safety round/check completed  Taken 10/6/2023 1230 by Venecia Willis RN  Safety Promotion/Fall Prevention:   activity supervised   assistive device/personal items within reach   safety round/check completed  Taken 10/6/2023 1020 by Venecia Willis RN  Safety Promotion/Fall Prevention:   activity supervised   assistive device/personal items within reach   safety round/check completed  Taken 10/6/2023 0805 by Venecia Willis RN  Safety Promotion/Fall Prevention:   activity supervised   assistive device/personal items within reach   clutter free environment maintained   fall prevention program maintained   lighting adjusted   muscle strengthening facilitated   nonskid shoes/slippers when out of bed   room organization consistent   safety round/check completed     Problem: Skin Injury Risk Increased  Goal: Skin Health and  Integrity  Outcome: Ongoing, Progressing  Intervention: Optimize Skin Protection  Recent Flowsheet Documentation  Taken 10/6/2023 1631 by Venecia Willis RN  Head of Bed (HOB) Positioning: HOB elevated  Taken 10/6/2023 1458 by Venecia Willis RN  Head of Bed (HOB) Positioning: HOB elevated  Taken 10/6/2023 1230 by Venecia Willis RN  Head of Bed (HOB) Positioning: HOB elevated  Taken 10/6/2023 1020 by Venecia Willis RN  Head of Bed (HOB) Positioning: HOB elevated  Taken 10/6/2023 0805 by Venecia Willis RN  Pressure Reduction Techniques: frequent weight shift encouraged  Head of Bed (HOB) Positioning: HOB elevated  Pressure Reduction Devices: pressure-redistributing mattress utilized  Skin Protection:   adhesive use limited   incontinence pads utilized     Problem: Hypertension Comorbidity  Goal: Blood Pressure in Desired Range  Outcome: Ongoing, Progressing  Intervention: Maintain Blood Pressure Management  Recent Flowsheet Documentation  Taken 10/6/2023 0805 by Venecia Willis RN  Medication Review/Management: medications reviewed     Problem: Adult Inpatient Plan of Care  Goal: Plan of Care Review  Outcome: Ongoing, Progressing  Flowsheets (Taken 10/6/2023 1727)  Progress: improving  Plan of Care Reviewed With: patient   Goal Outcome Evaluation:  Plan of Care Reviewed With: patient      Pt currently in bed resting quietly. No complaints of pain or discomfort at this time. VSS on RA. Neuro checks WNL. Pt ambulatory with walker and 1 assist. Awaiting MRI. Possible discharge tomorrow. No other observations at this time. Call bell in reach.  Progress: improving

## 2023-10-06 NOTE — PLAN OF CARE
Problem: Fall Injury Risk  Goal: Absence of Fall and Fall-Related Injury  Outcome: Ongoing, Progressing  Intervention: Promote Injury-Free Environment  Recent Flowsheet Documentation  Taken 10/6/2023 0400 by Lizeth Shah RN  Safety Promotion/Fall Prevention:   assistive device/personal items within reach   clutter free environment maintained   fall prevention program maintained   nonskid shoes/slippers when out of bed   room organization consistent   safety round/check completed  Taken 10/6/2023 0357 by Lizeth Shah RN  Safety Promotion/Fall Prevention:   assistive device/personal items within reach   clutter free environment maintained   fall prevention program maintained   nonskid shoes/slippers when out of bed   room organization consistent   safety round/check completed     Problem: Skin Injury Risk Increased  Goal: Skin Health and Integrity  Outcome: Ongoing, Progressing  Intervention: Optimize Skin Protection  Recent Flowsheet Documentation  Taken 10/6/2023 0400 by Lizeth Shah RN  Pressure Reduction Techniques: frequent weight shift encouraged  Head of Bed (HOB) Positioning: HOB elevated  Pressure Reduction Devices: pressure-redistributing mattress utilized  Skin Protection:   adhesive use limited   incontinence pads utilized   tubing/devices free from skin contact  Taken 10/6/2023 0357 by Lizeth Shah RN  Head of Bed (HOB) Positioning: HOB elevated  Taken 10/6/2023 0353 by Lizeth Shah RN  Pressure Reduction Techniques: frequent weight shift encouraged  Pressure Reduction Devices: pressure-redistributing mattress utilized  Skin Protection:   adhesive use limited   incontinence pads utilized   tubing/devices free from skin contact     Problem: Hypertension Comorbidity  Goal: Blood Pressure in Desired Range  Outcome: Ongoing, Progressing   Goal Outcome Evaluation:

## 2023-10-06 NOTE — THERAPY EVALUATION
Acute Care - Speech Language Pathology Initial Evaluation  [place]  Cognitive-Communication Evaluation   Clinical Swallow Evaluation      Patient Name: Aretha Angela  : 1940  MRN: [id number]  Today's Date: 10/6/2023               Admit Date: 10/5/2023     Visit Dx:    ICD-10-CM ICD-9-CM   1. Weakness of right upper extremity  R29.898 729.89   2. Hypertensive urgency  I16.0 401.9   3. Acute midline thoracic back pain  M54.6 724.1   4. Atypical chest pain  R07.89 786.59   5. Diarrhea, unspecified type  R19.7 787.91   6. Incontinence of feces, unspecified fecal incontinence type  R15.9 787.60   7. Elevated d-dimer  R79.89 790.92     Patient Active Problem List   Diagnosis    S/P aortic valve replacement with bioprosthetic valve    Hypertensive urgency     Past Medical History:   Diagnosis Date    Chronic gastric ulcer     Dysphagia     GERD (gastroesophageal reflux disease)     Hypertension     Nausea      Past Surgical History:   Procedure Laterality Date    AORTIC VALVE REPAIR/REPLACEMENT      HYSTERECTOMY      KNEE ARTHROSCOPY         SLP Recommendation and Plan  SLP Diagnosis: functional speech/language skills, functional cognitive-linguistic skills (10/06/23 1020)  SLP Diagnosis Comments: Pt/son report some baseline memory issues- no acute. Pt being worked up by [facility]. Provided edu on OP SLP if desired. (10/06/23 1020)        Swallow Criteria for Skilled Therapeutic Interventions Met: no problems identified which require skilled intervention (10/06/23 1020)  SLC Criteria for Skilled Therapy Interventions Met: baseline status, no problems identified which require skilled intervention (10/06/23 1020)  Anticipated Discharge Disposition (SLP): No further SLP services warranted (10/06/23 1020)     Therapy Frequency (Swallow): evaluation only (10/06/23 1020)     Oral Care Recommendations: Oral Care BID/PRN (10/06/23 1020)                          Plan of Care Reviewed With: patient, son  (10/06/23 7770)      SLP EVALUATION (last 72 hours)       SLP SLC Evaluation       Row Name 10/06/23 1020                   Communication Assessment/Intervention    Document Type evaluation  -MP        Subjective Information no complaints  -MP        Patient Observations alert;cooperative  -MP        Patient/Family/Caregiver Comments/Observations Son present  -MP        Patient Effort good  -MP           General Information    Patient Profile Reviewed yes  -MP        Pertinent History Of Current Problem Adm 10/5 w/ HTN urgency, neck pain. CT head & perfusion negative for acute infarct. MRI pending. Hx aortic bioprosthetic valve. Passed dysphagia screen but SLP still received swallow consult.  -MP        Precautions/Limitations, Vision WFL;for purposes of eval  -MP        Precautions/Limitations, Hearing WFL;for purposes of eval  -MP        Prior Level of Function-Communication other (see comments)  some baseline memory issues per pt & son-- currently being worked up by [facility]  -MP        Plans/Goals Discussed with patient;family;agreed upon  -MP        Barriers to Rehab none identified  -MP        Patient's Goals for Discharge patient did not state  -MP        Family Goals for Discharge family did not state  -MP           Pain    Additional Documentation Pain Scale: FACES Pre/Post-Treatment (Group)  -MP           Pain Scale: FACES Pre/Post-Treatment    Pain: FACES Scale, Pretreatment 0-->no hurt  -MP        Posttreatment Pain Rating 0-->no hurt  -MP           Comprehension Assessment/Intervention    Comprehension Assessment/Intervention Auditory Comprehension  -MP           Auditory Comprehension Assessment/Intervention    Auditory Comprehension (Communication) WFL  -MP           Expression Assessment/Intervention    Expression Assessment/Intervention verbal expression  -MP           Verbal Expression Assessment/Intervention    Verbal Expression WFL  -MP           Motor Speech  Assessment/Intervention    Motor Speech Function WFL  -MP           Cognitive Assessment Intervention- SLP    Cognitive Function (Cognition) WFL  -MP           SLP Evaluation Clinical Impressions    SLP Diagnosis functional speech/language skills;functional cognitive-linguistic skills  -MP        SLP Diagnosis Comments Pt/son report some baseline memory issues- no acute. Pt being worked up by [facility]. Provided edu on OP SLP if desired.  -MP        SLC Criteria for Skilled Therapy Interventions Met baseline status;no problems identified which require skilled intervention  -MP           Recommendations    Therapy Frequency (SLP SLC) evaluation only  -MP        Anticipated Discharge Disposition (SLP) No further SLP services warranted  -MP                  User Key  (r) = Recorded By, (t) = Taken By, (c) = Cosigned By      Initials Name Effective Dates    Ananda Ambrosio MS CCC-SLP 12/28/21 -                        EDUCATION  The patient has been educated in the following areas:     Cognitive Impairment Communication Impairment.                      Time Calculation:      Time Calculation- SLP       Row Name 10/06/23 1054             Time Calculation- SLP    SLP Start Time 1020  -MP      SLP Received On 10/06/23  -MP         Untimed Charges    30591-BR Eval Speech and Production w/ Language Minutes 25  -MP      48220-KL Eval Oral Pharyng Swallow Minutes 25  -MP         Total Minutes    Untimed Charges Total Minutes 50  -MP       Total Minutes 50  -MP                User Key  (r) = Recorded By, (t) = Taken By, (c) = Cosigned By      Initials Name Provider Type    Ananda Ambrosio, MS CCC-SLP Speech and Language Pathologist                    Therapy Charges for Today       Code Description Service Date Service Provider Modifiers Qty    25308019831 HC ST EVAL ORAL PHARYNG SWALLOW 2 10/6/2023 Ananda Ortez MS CCC-SLP GN 1    93715146793 HC ST EVAL SPEECH AND PROD W LANG  2  10/6/2023 Ananda Ortez, MS CCC-SLP GN 1                       Ananda Alvarenga MS CCC-SLP  10/6/2023   and Acute Care - Speech Language Pathology   Swallow Initial Evaluation  [place]     Patient Name: Aretha Angela  : 1940  MRN: [id number]  Today's Date: 10/6/2023               Admit Date: 10/5/2023    Visit Dx:     ICD-10-CM ICD-9-CM   1. Weakness of right upper extremity  R29.898 729.89   2. Hypertensive urgency  I16.0 401.9   3. Acute midline thoracic back pain  M54.6 724.1   4. Atypical chest pain  R07.89 786.59   5. Diarrhea, unspecified type  R19.7 787.91   6. Incontinence of feces, unspecified fecal incontinence type  R15.9 787.60   7. Elevated d-dimer  R79.89 790.92     Patient Active Problem List   Diagnosis    S/P aortic valve replacement with bioprosthetic valve    Hypertensive urgency     Past Medical History:   Diagnosis Date    Chronic gastric ulcer     Dysphagia     GERD (gastroesophageal reflux disease)     Hypertension     Nausea      Past Surgical History:   Procedure Laterality Date    AORTIC VALVE REPAIR/REPLACEMENT      HYSTERECTOMY      KNEE ARTHROSCOPY         SLP Recommendation and Plan  SLP Swallowing Diagnosis: swallow WFL/no suspected pharyngeal impairment (10/06/23 1020)  SLP Diet Recommendation: thin liquids, regular textures (10/06/23 1020)  Recommended Precautions and Strategies: general aspiration precautions (10/06/23 1020)  SLP Rec. for Method of Medication Administration: as tolerated (10/06/23 102)     Monitor for Signs of Aspiration: notify SLP if any concerns (10/06/23 1020)     Swallow Criteria for Skilled Therapeutic Interventions Met: no problems identified which require skilled intervention (10/06/23 1020)  Anticipated Discharge Disposition (SLP): No further SLP services warranted (10/06/23 1020)     Therapy Frequency (Swallow): evaluation only (10/06/23 1020)     Oral Care Recommendations: Oral Care BID/PRN (10/06/23 1020)                                       Oral Care Recommendations: Oral Care BID/PRN (10/06/23 1020)    Plan of Care Reviewed With: patient, son      SWALLOW EVALUATION (last 72 hours)       SLP Adult Swallow Evaluation       Row Name 10/06/23 1020                   General Information    Current Method of Nutrition regular textures;thin liquids  -MP        Prior Level of Function-Communication other (see comments)  baseline memory issues  -MP        Prior Level of Function-Swallowing no diet consistency restrictions  -MP        Patient's Goals for Discharge patient did not state  -MP        Family Goals for Discharge family did not state  -MP           Oral Motor Structure and Function    Dentition Assessment natural, present and adequate  -MP        Secretion Management WNL/WFL  -MP        Mucosal Quality moist, healthy  -MP           Oral Musculature and Cranial Nerve Assessment    Oral Motor General Assessment WFL  -MP           General Eating/Swallowing Observations    Respiratory Support Currently in Use room air  -MP        Eating/Swallowing Skills self-fed  -MP        Positioning During Eating upright in bed  -MP        Utensils Used spoon;cup;straw  -MP        Consistencies Trialed thin liquids;regular textures  -MP           Clinical Swallow Eval    Oral Prep Phase WFL  -MP        Pharyngeal Phase no overt signs/symptoms of pharyngeal impairment  -MP           SLP Evaluation Clinical Impression    SLP Swallowing Diagnosis swallow WFL/no suspected pharyngeal impairment  -MP        Swallow Criteria for Skilled Therapeutic Interventions Met no problems identified which require skilled intervention  -MP           Recommendations    Therapy Frequency (Swallow) evaluation only  -MP        SLP Diet Recommendation thin liquids;regular textures  -MP        Recommended Precautions and Strategies general aspiration precautions  -MP        Oral Care Recommendations Oral Care BID/PRN  -MP        SLP Rec. for Method of Medication  Administration as tolerated  -MP        Monitor for Signs of Aspiration notify SLP if any concerns  -MP                  User Key  (r) = Recorded By, (t) = Taken By, (c) = Cosigned By      Initials Name Effective Dates    Ananda Ambrosio MS CCC-SLP 12/28/21 -                     EDUCATION  The patient has been educated in the following areas:   Dysphagia (Swallowing Impairment).              Time Calculation:    Time Calculation- SLP       Row Name 10/06/23 1054             Time Calculation- SLP    SLP Start Time 1020  -MP      SLP Received On 10/06/23  -MP         Untimed Charges    62286-MM Eval Speech and Production w/ Language Minutes 25  -MP      28835-YQ Eval Oral Pharyng Swallow Minutes 25  -MP         Total Minutes    Untimed Charges Total Minutes 50  -MP       Total Minutes 50  -MP                User Key  (r) = Recorded By, (t) = Taken By, (c) = Cosigned By      Initials Name Provider Type    Ananda Ambrosio MS CCC-SLP Speech and Language Pathologist                    Therapy Charges for Today       Code Description Service Date Service Provider Modifiers Qty    96040341843 HC ST EVAL ORAL PHARYNG SWALLOW 2 10/6/2023 Ananda Ortez MS CCC-SLP GN 1    13287301672 HC ST EVAL SPEECH AND PROD W LANG  2 10/6/2023 Ananda Ortez MS CCC-SLP GN 1                 MS SANDRA Mendenhall  10/6/2023

## 2023-10-06 NOTE — CONSULTS
Diabetes Education    Patient Name:  Aretha Angela  YOB: 1940  MRN: [id number]  Admit Date:  10/5/2023      Order criteria not met for diabetes education consult. Current A1c is 5.2, noted during chart review. Pt has no history of DM and no home meds for DM. Thank you.      Electronically signed by:  Ellen Lott RN  10/06/23 11:57 EDT

## 2023-10-06 NOTE — ED PROVIDER NOTES
"Subjective   History of Present Illness  83-year-old right-hand-dominant female presents to the emergency department accompanied by her son with concerns about severe pain to her upper back which began while she was eating, with associated diarrhea and fecal incontinence, as well as nausea.  She also reports pain that radiates to her chest, bilateral shoulders, and bilateral upper extremities.  Her pain is 7 out of 10 in intensity.  She denies similar prior episodes in the past.  She reports a history of chronic hypertension and states that her blood pressure \"is always high.\"    Review of Systems   Constitutional:  Negative for diaphoresis and fever.   Eyes:  Negative for photophobia and discharge.   Respiratory:  Negative for stridor.    Cardiovascular:  Positive for chest pain.   Gastrointestinal:  Positive for diarrhea and nausea. Negative for vomiting.        Fecal incontinence   Musculoskeletal:  Positive for back pain.   Neurological:  Negative for speech difficulty.        She denies saddle anesthesia or acute lower extremity weakness.     Past Medical History:   Diagnosis Date    Chronic gastric ulcer     Dysphagia     GERD (gastroesophageal reflux disease)     Hypertension     Nausea        No Known Allergies    Past Surgical History:   Procedure Laterality Date    AORTIC VALVE REPAIR/REPLACEMENT      HYSTERECTOMY      KNEE ARTHROSCOPY         History reviewed. No pertinent family history.    Social History     Socioeconomic History    Marital status:     Highest education level: Master's degree (e.g., MA, MS, Tim, MEd, MSW, ANDREA)   Tobacco Use    Smoking status: Former    Smokeless tobacco: Never    Tobacco comments:     quit 1970's   Substance and Sexual Activity    Alcohol use: Yes     Alcohol/week: 2.0 standard drinks     Types: 2 Glasses of wine per week    Drug use: Never           Objective   Physical Exam  Vitals and nursing note reviewed.   Constitutional:       Appearance: She is not " diaphoretic.      Comments: Patient is initially evaluated in the provider in triage area secondary to a full ER.  Upon my initial assessment, she is quite anxious and has difficulty providing history, repeatedly asks for pain medication.  BMI 22.   Eyes:      Comments: No photophobia or nystagmus.   Neck:      Comments: No midline C spine tenderness or step off.  Cardiovascular:      Rate and Rhythm: Normal rate and regular rhythm.      Heart sounds: No murmur heard.    No friction rub. No gallop.      Comments: S1, S2  Pulmonary:      Effort: Pulmonary effort is normal. No respiratory distress.      Breath sounds: Normal breath sounds. No stridor. No decreased breath sounds, wheezing, rhonchi or rales.   Abdominal:      Palpations: Abdomen is soft.      Tenderness: There is no abdominal tenderness. There is no guarding or rebound.      Comments: Nondistended. Incontinent of feces in the triage area of the emergency department.   Musculoskeletal:      Cervical back: Neck supple.      Comments: No significant peripheral edema.  Thoracic kyphosis.  No midline T-spine tenderness or step-off.  No crepitus.  No palpable spasm.   Skin:     General: Skin is warm and dry.   Neurological:      Mental Status: She is alert.      Comments: On my initial exam, patient moving all extremities, had normal speech, no dysarthria, no facial droop.  No focal weakness appreciated upon my initial exam, but patient was too uncomfortable to participate much in a detailed neurologic examination.   Psychiatric:         Mood and Affect: Mood is anxious.       Procedures           ED Course  ED Course as of 10/06/23 0216   Fri Oct 06, 2023   0033 193/90 repeat BP [LD]   0052 Case discussed with SAFIA Quezada. Results and plan discussed with the patient and her son at the bedside. Patient re-examined and has right upper extremity weakness on re-examination. She is right hand dominant. 5/5 power bilateral extremities, symmetric.   Sensation grossly intact to light touch. [LD]      ED Course User Index  [LD] Concha Elmore MD                      HEART Score: 5                      Medical Decision Making  Differential diagnosis includes pulmonary embolus, aortic dissection, hypertensive emergency, acute coronary syndrome, and others.    Problems Addressed:  Acute midline thoracic back pain: complicated acute illness or injury  Atypical chest pain: complicated acute illness or injury  Diarrhea, unspecified type: complicated acute illness or injury  Elevated d-dimer: complicated acute illness or injury  Hypertensive urgency: complicated acute illness or injury  Incontinence of feces, unspecified fecal incontinence type: complicated acute illness or injury  Weakness of right upper extremity: complicated acute illness or injury    Amount and/or Complexity of Data Reviewed  Independent Historian:      Details: Son at bedside  Labs: ordered. Decision-making details documented in ED Course.  Radiology: ordered. Decision-making details documented in ED Course.  ECG/medicine tests: ordered and independent interpretation performed. Decision-making details documented in ED Course.     Details: EKG at 2215 shows normal sinus rhythm at a rate of 60 bpm, inferior Q waves, lateral Q waves, no acute ischemic changes.  Discussion of management or test interpretation with external provider(s): Case discussed with stroke neurology service, SAFIA Quezada, to see patient in consultation, discussed at approximately 0100. Will contact Dr. Rogers hospitalist as soon as neuroimaging is complete.    Risk  Prescription drug management.  Parenteral controlled substances.  Decision regarding hospitalization.      Recent Results (from the past 24 hour(s))   ECG 12 Lead ED Triage Standing Order; Chest Pain    Collection Time: 10/05/23 10:15 PM   Result Value Ref Range    QT Interval 412 ms    QTC Interval 438 ms   Comprehensive Metabolic Panel    Collection Time:  10/05/23 11:28 PM    Specimen: Blood   Result Value Ref Range    Glucose 198 (H) 65 - 99 mg/dL    BUN 18 8 - 23 mg/dL    Creatinine 0.75 0.57 - 1.00 mg/dL    Sodium 139 136 - 145 mmol/L    Potassium 4.0 3.5 - 5.2 mmol/L    Chloride 106 98 - 107 mmol/L    CO2 25.0 22.0 - 29.0 mmol/L    Calcium 9.3 8.6 - 10.5 mg/dL    Total Protein 7.1 6.0 - 8.5 g/dL    Albumin 3.9 3.5 - 5.2 g/dL    ALT (SGPT) 9 1 - 33 U/L    AST (SGOT) 16 1 - 32 U/L    Alkaline Phosphatase 86 39 - 117 U/L    Total Bilirubin 0.2 0.0 - 1.2 mg/dL    Globulin 3.2 gm/dL    A/G Ratio 1.2 g/dL    BUN/Creatinine Ratio 24.0 7.0 - 25.0    Anion Gap 8.0 5.0 - 15.0 mmol/L    eGFR 79.1 >60.0 mL/min/1.73   Lipase    Collection Time: 10/05/23 11:28 PM    Specimen: Blood   Result Value Ref Range    Lipase 50 13 - 60 U/L   BNP    Collection Time: 10/05/23 11:28 PM    Specimen: Blood   Result Value Ref Range    proBNP 438.0 0.0 - 1,800.0 pg/mL   Green Top (Gel)    Collection Time: 10/05/23 11:28 PM   Result Value Ref Range    Extra Tube Hold for add-ons.    Lavender Top    Collection Time: 10/05/23 11:28 PM   Result Value Ref Range    Extra Tube hold for add-on    Gold Top - SST    Collection Time: 10/05/23 11:28 PM   Result Value Ref Range    Extra Tube Hold for add-ons.    Light Blue Top    Collection Time: 10/05/23 11:28 PM   Result Value Ref Range    Extra Tube Hold for add-ons.    CBC Auto Differential    Collection Time: 10/05/23 11:28 PM    Specimen: Blood   Result Value Ref Range    WBC 7.87 3.40 - 10.80 10*3/mm3    RBC 4.23 3.77 - 5.28 10*6/mm3    Hemoglobin 12.3 12.0 - 15.9 g/dL    Hematocrit 38.7 34.0 - 46.6 %    MCV 91.5 79.0 - 97.0 fL    MCH 29.1 26.6 - 33.0 pg    MCHC 31.8 31.5 - 35.7 g/dL    RDW 12.9 12.3 - 15.4 %    RDW-SD 42.7 37.0 - 54.0 fl    MPV 11.3 6.0 - 12.0 fL    Platelets 195 140 - 450 10*3/mm3    Neutrophil % 83.3 (H) 42.7 - 76.0 %    Lymphocyte % 10.4 (L) 19.6 - 45.3 %    Monocyte % 4.1 (L) 5.0 - 12.0 %    Eosinophil % 1.1 0.3 - 6.2 %     Basophil % 0.6 0.0 - 1.5 %    Immature Grans % 0.5 0.0 - 0.5 %    Neutrophils, Absolute 6.55 1.70 - 7.00 10*3/mm3    Lymphocytes, Absolute 0.82 0.70 - 3.10 10*3/mm3    Monocytes, Absolute 0.32 0.10 - 0.90 10*3/mm3    Eosinophils, Absolute 0.09 0.00 - 0.40 10*3/mm3    Basophils, Absolute 0.05 0.00 - 0.20 10*3/mm3    Immature Grans, Absolute 0.04 0.00 - 0.05 10*3/mm3    nRBC 0.0 0.0 - 0.2 /100 WBC   D-dimer, Quantitative    Collection Time: 10/05/23 11:28 PM    Specimen: Blood   Result Value Ref Range    D-Dimer, Quantitative 0.91 (H) 0.00 - 0.83 MCGFEU/mL   Magnesium    Collection Time: 10/05/23 11:28 PM    Specimen: Blood   Result Value Ref Range    Magnesium 2.2 1.6 - 2.4 mg/dL   Lactic Acid, Plasma    Collection Time: 10/05/23 11:28 PM    Specimen: Blood   Result Value Ref Range    Lactate 1.3 0.5 - 2.0 mmol/L   High Sensitivity Troponin T    Collection Time: 10/05/23 11:28 PM    Specimen: Blood   Result Value Ref Range    HS Troponin T 15 (H) <10 ng/L   aPTT    Collection Time: 10/05/23 11:28 PM    Specimen: Blood   Result Value Ref Range    PTT 28.9 22.0 - 39.0 seconds   Protime-INR    Collection Time: 10/05/23 11:28 PM    Specimen: Blood   Result Value Ref Range    Protime 13.1 12.2 - 14.5 Seconds    INR 0.98 0.89 - 1.12   Urinalysis With Microscopic If Indicated (No Culture) - Indwelling Urethral Catheter    Collection Time: 10/06/23 12:08 AM    Specimen: Indwelling Urethral Catheter; Urine   Result Value Ref Range    Color, UA Yellow Yellow, Straw    Appearance, UA Clear Clear    pH, UA 7.5 5.0 - 8.0    Specific Gravity, UA 1.034 (H) 1.001 - 1.030    Glucose,  mg/dL (2+) (A) Negative    Ketones, UA Negative Negative    Bilirubin, UA Negative Negative    Blood, UA Negative Negative    Protein, UA Negative Negative    Leuk Esterase, UA Negative Negative    Nitrite, UA Negative Negative    Urobilinogen, UA 0.2 E.U./dL 0.2 - 1.0 E.U./dL   ECG 12 Lead ED Triage Standing Order; Chest Pain    Collection Time:  10/06/23  1:29 AM   Result Value Ref Range    QT Interval 384 ms    QTC Interval 440 ms   High Sensitivity Troponin T 2Hr    Collection Time: 10/06/23  1:38 AM    Specimen: Blood   Result Value Ref Range    HS Troponin T 15 (H) <10 ng/L    Troponin T Delta 0 >=-4 - <+4 ng/L     Note: In addition to lab results from this visit, the labs listed above may include labs taken at another facility or during a different encounter within the last 24 hours. Please correlate lab times with ED admission and discharge times for further clarification of the services performed during this visit.    CT Head Without Contrast Stroke Protocol   Final Result   Impression:   Mild atrophy and chronic microvascular ischemia. No acute intracranial process.         Electronically Signed: Ricco Wisdom MD     10/6/2023 2:06 AM EDT     Workstation ID: [id number]      CT Angiogram Chest   Final Result   Impression:      No vascular filling defect to suggest pulmonary emboli.         Findings most suggestive of mild pulmonary vascular congestion.      Cardiomegaly.      Hiatal hernia.                                             Electronically Signed: Gio Kevin MD     10/5/2023 11:02 PM EDT     Workstation ID: [id number]      CT Abdomen Pelvis Without Contrast   Final Result   1.No evidence for significant nephrolithiasis. There is no evidence for obstructive uropathy.   2.No evidence for acute abnormality throughout the abdomen or pelvis on this noncontrast exam.   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   4.There is a small hiatal hernia. There is associated reflux.            Electronically Signed: Leandro Chávez MD     10/5/2023 10:56 PM EDT     Workstation ID: [id number]      CT Angiogram Head w AI Analysis of LVO    (Results Pending)   CT Angiogram Neck    (Results Pending)   CT CEREBRAL PERFUSION WITH & WITHOUT CONTRAST     (Results Pending)     Vitals:    10/06/23 0043 10/06/23 0100 10/06/23 0115 10/06/23 0129   BP: (!) 208/107 (!) 188/101 (!) 212/106 (!) 206/94   Pulse: 81 80 80 76   Resp:       Temp:       TempSrc:       SpO2: 96% 97% 96% 96%   Weight:       Height:         Medications   sodium chloride 0.9 % flush 10 mL (has no administration in time range)   cloNIDine (CATAPRES) tablet 0.1 mg (has no administration in time range)   niCARdipine (CARDENE) 25mg in 250mL NS infusion (has no administration in time range)   morphine injection 2 mg (has no administration in time range)   ondansetron (ZOFRAN) injection 4 mg (4 mg Intravenous Given 10/5/23 2258)   morphine injection 2 mg (2 mg Intravenous Given 10/5/23 2307)   iopamidol (ISOVUE-370) 76 % injection 100 mL (75 mL Intravenous Given 10/5/23 2252)   nitroglycerin (NITROSTAT) ointment 1 inch (1 inch Topical Given 10/6/23 0103)   labetalol (NORMODYNE,TRANDATE) injection 10 mg (10 mg Intravenous Given 10/6/23 0103)   iopamidol (ISOVUE-370) 76 % injection 150 mL (115 mL Intravenous Given 10/6/23 0208)     ECG/EMG Results (last 24 hours)       Procedure Component Value Units Date/Time    ECG 12 Lead ED Triage Standing Order; Chest Pain [[id number]] Collected: 10/05/23 2215     Updated: 10/05/23 2214     QT Interval 412 ms      QTC Interval 438 ms     Narrative:      Test Reason : ED Triage Standing Order~  Blood Pressure :   */*   mmHG  Vent. Rate :  68 BPM     Atrial Rate :  68 BPM     P-R Int : 184 ms          QRS Dur :  76 ms      QT Int : 412 ms       P-R-T Axes :  50 -36  62 degrees     QTc Int : 438 ms    Normal sinus rhythm  Possible Left atrial enlargement  Left axis deviation  Inferior infarct (cited on or before 27-JUN-2022)  Anterolateral infarct , age undetermined  Abnormal ECG  When compared with ECG of 27-JUN-2022 13:18,  Anterior infarct is now present  Anterolateral infarct is now present  ST no longer elevated in Anterior leads    Referred By: EDMD            Confirmed By:           ECG 12 Lead ED Triage Standing Order; Chest Pain   Preliminary Result   Test Reason : ED Triage Standing Order~   Blood Pressure :   */*   mmHG   Vent. Rate :  79 BPM     Atrial Rate :  79 BPM      P-R Int : 162 ms          QRS Dur :  74 ms       QT Int : 384 ms       P-R-T Axes :  -2 -35  63 degrees      QTc Int : 440 ms      ** Poor data quality, interpretation may be adversely affected   Normal sinus rhythm   Left axis deviation   Inferior infarct (cited on or before 27-JUN-2022)   Cannot rule out Anterior infarct (cited on or before 27-JUN-2022)   T wave abnormality, consider lateral ischemia   Abnormal ECG   When compared with ECG of 05-OCT-2023 22:15, (Unconfirmed)   Questionable change in initial forces of Lateral leads      Referred By: EDMD           Confirmed By:       ECG 12 Lead ED Triage Standing Order; Chest Pain   Preliminary Result   Test Reason : ED Triage Standing Order~   Blood Pressure :   */*   mmHG   Vent. Rate :  68 BPM     Atrial Rate :  68 BPM      P-R Int : 184 ms          QRS Dur :  76 ms       QT Int : 412 ms       P-R-T Axes :  50 -36  62 degrees      QTc Int : 438 ms      Normal sinus rhythm   Possible Left atrial enlargement   Left axis deviation   Inferior infarct (cited on or before 27-JUN-2022)   Anterolateral infarct , age undetermined   Abnormal ECG   When compared with ECG of 27-JUN-2022 13:18,   Anterior infarct is now present   Anterolateral infarct is now present   ST no longer elevated in Anterior leads      Referred By: NANDINI           Confirmed By:               Final diagnoses:   Weakness of right upper extremity   Hypertensive urgency   Acute midline thoracic back pain   Atypical chest pain   Diarrhea, unspecified type   Incontinence of feces, unspecified fecal incontinence type   Elevated d-dimer       ED Disposition  ED Disposition       ED Disposition   Decision to Admit    Condition   --    Comment   --               No follow-up provider  specified.       Medication List      No changes were made to your prescriptions during this visit.            Concha Elmore MD  10/06/23 0108       Concha Elmore MD  10/06/23 0109       Concha Elmore MD  10/06/23 0218

## 2023-10-06 NOTE — CASE MANAGEMENT/SOCIAL WORK
Continued Stay Note  [facility]     Patient Name: Aretha Angela  MRN: [id number]  Today's Date: 10/6/2023    Admit Date: 10/5/2023    Plan: Home   Discharge Plan       Row Name 10/06/23 1509       Plan    Plan Home    Patient/Family in Agreement with Plan yes    Plan Comments I have met with Ms. Angela at the bedside today to discuss the discharge plan.  She reports that she is independent with activities of daily living and uses a rolling walker to assist with mobility. She resides in her home in [place].  She denies current receipt of home health/outpatient services.  She states that she has hired a  to assist her with transportation and she also pays for someone to provide housecleaning services once a month.  She states that she plans to return home at discharge.  I have offered to submit referrals to home health agencies, however she declines these services.  She requests a list of caregiving services for needs to assist with cooking /cleaning.  I have provided a  [facility] [place] [facility] list with contact #s.  Ms. Angela states that her son will be available to assist her at home as well as provide transportation when needed.  She denies having any additional discharge needs at this time.    Final Discharge Disposition Code 01 - home or self-care                   Discharge Codes    No documentation.                       Ayde Momin RN

## 2023-10-06 NOTE — CONSULTS
Stroke Consult Note    Patient Name: Aretha Angela   MRN: [id number]  Age: 83 y.o.  Sex: female  : 1940    Primary Care Physician: Kt Heredia MD  Referring Physician:  Dr. Elmore    TIME STROKE TEAM CALLED: 55 EST     TIME PATIENT SEEN: 58 EST    Handedness: Right  Race: White     Chief Complaint/Reason for Consultation: Right upper extremity weakness    HPI: Ms. Angela is a 83-year-old female with PMH of HTN and s/p aortic valve replacement with bioprosthetic valve.  She presents to [facility] ED with new onset of back pain, nausea and right upper extremity weakness.  Patient reports while at dinner she experienced new onset of neck and back pain.  In addition Ms. Angela has been nauseous with episodes of fecal incontinence.  She arrived by POV to [facility] ED for further evaluation of presenting symptoms.    Initial NIH is 0.  Blood pressure 188/94.  On exam patient is able to answer questions appropriately and follow two-step commands.  Denies any sensory deficits.  No headache, falls or recent trauma.  Strength 5/5 in all extremities.  Former smoker and occasional EtOH use.  Patient states she does not take her prescribed medications routinely.    Last Known Normal Date/Time: 10/05/2023 at 1900 EST     Review of Systems   Constitutional:  Negative for fever.   HENT:  Negative for trouble swallowing and voice change.    Eyes:  Negative for visual disturbance.   Respiratory:  Negative for shortness of breath.    Cardiovascular:  Negative for chest pain.   Gastrointestinal:  Positive for nausea. Negative for abdominal pain.   Musculoskeletal:  Positive for back pain and neck pain.   Neurological:  Positive for weakness. Negative for dizziness, facial asymmetry, speech difficulty, light-headedness, numbness and headaches.   Psychiatric/Behavioral:  Negative for confusion.     Past Medical History:   Diagnosis Date    Chronic gastric ulcer     Dysphagia     GERD (gastroesophageal reflux disease)      Hypertension     Nausea      Past Surgical History:   Procedure Laterality Date    AORTIC VALVE REPAIR/REPLACEMENT      HYSTERECTOMY      KNEE ARTHROSCOPY       History reviewed. No pertinent family history.  Social History     Socioeconomic History    Marital status:     Highest education level: Master's degree (e.g., MA, MS, Tim, MEd, MSW, ANDREA)   Tobacco Use    Smoking status: Former    Smokeless tobacco: Never    Tobacco comments:     quit 1970's   Substance and Sexual Activity    Alcohol use: Yes     Alcohol/week: 2.0 standard drinks     Types: 2 Glasses of wine per week    Drug use: Never     No Known Allergies  Prior to Admission medications    Medication Sig Start Date End Date Taking? Authorizing Provider   aspirin 81 MG chewable tablet Chew 81 mg Daily.    Nettie Cruz MD   metoprolol succinate XL (TOPROL-XL) 50 MG 24 hr tablet Take 50 mg by mouth Daily. 2/14/20   Nettie Cruz MD         Temp:  [98 øF (36.7 øC)] 98 øF (36.7 øC)  Heart Rate:  [68-81] 80  Resp:  [16] 16  BP: (160-221)/() 212/106  Neurological Exam  Mental Status  Alert. Oriented to person, place and time. Oriented to person, place, and time. Speech is normal. Language is fluent with no aphasia.    Cranial Nerves  CN II: Visual fields full to confrontation.  CN III, IV, VI: Extraocular movements intact bilaterally. Pupils equal round and reactive to light bilaterally.  CN V: Facial sensation is normal.  CN VII: Full and symmetric facial movement.  CN VIII: Hearing intact.  CN XI: Shoulder shrug strength is normal.  CN XII: Tongue midline without atrophy or fasciculations.    Motor  Normal muscle bulk throughout. Normal muscle tone. Strength is 5/5 throughout all four extremities.  Right hand  weaker than left hand .    Sensory  Light touch is normal in upper and lower extremities.     Coordination  Right: Finger-to-nose normal. Heel-to-shin normal.Left: Finger-to-nose normal. Heel-to-shin  normal.    Gait    Unable to assess.    Physical Exam  Constitutional:       General: She is not in acute distress.     Appearance: Normal appearance. She is not ill-appearing.   HENT:      Head: Normocephalic and atraumatic.      Nose: Nose normal.      Mouth/Throat:      Mouth: Mucous membranes are dry.   Eyes:      Extraocular Movements: Extraocular movements intact.      Pupils: Pupils are equal, round, and reactive to light.   Cardiovascular:      Pulses: Normal pulses.   Pulmonary:      Effort: Pulmonary effort is normal. No respiratory distress.   Abdominal:      General: Abdomen is flat.   Musculoskeletal:         General: Normal range of motion.      Cervical back: Normal range of motion.   Skin:     General: Skin is warm and dry.   Neurological:      Mental Status: She is alert and oriented to person, place, and time.      Cranial Nerves: No cranial nerve deficit.      Sensory: No sensory deficit.      Motor: Motor strength is normal. No weakness.      Coordination: Coordination normal.   Psychiatric:         Mood and Affect: Mood normal.         Speech: Speech normal.         Behavior: Behavior normal.       Acute Stroke Data    Thrombolytic Inclusion / Exclusion Criteria    Time: 01:29 EDT  Person Administering Scale: SAFIA Quezada    Inclusion Criteria  [x]   18 years of age or greater   []   Onset of symptoms < 4.5 hours before beginning treatment (stroke onset = time patient was last seen well or without symptoms).   []   Diagnosis of acute ischemic stroke causing measurable disabling deficit (Complete Hemianopia, Any Aphasia, Visual or Sensory Extinction, Any weakness limiting sustained effort against gravity)   []   Any remaining deficit considered potentially disabling in view of patient and practitioner   Exclusion criteria (Do not proceed with Alteplase if any are checked under exclusion criteria)  [x]   Onset unknown or GREATER than 4.5 hours   []   ICH on CT/MRI   []   CT demonstrates  hypodensity representing acute or subacute infarct   []   Significant head trauma or prior stroke in the previous 3 months   []   Symptoms suggestive of subarachnoid hemorrhage   []   History of un-ruptured intracranial aneurysm GREATER than 10 mm   []   Recent intracranial or intraspinal surgery within the last 3 months   []   Arterial puncture at a non-compressible site in the previous 7 days   []   Active internal bleeding   []   Acute bleeding tendency   []   Platelet count LESS than 100,000 for known hematological diseases such as leukemia, thrombocytopenia or chronic cirrhosis   []   Current use of anticoagulant with INR GREATER than 1.7 or PT GREATER than 15 seconds, aPTT GREATER than 40 seconds   []   Heparin received within 48 hours, resulting in abnormally elevated aPTT GREATER than upper limit of normal   []   Current use of direct thrombin inhibitors or direct factor Xa inhibitors in the past 48 hours   []   Elevated blood pressure refractory to treatment (systolic GREATER than 185 mm/Hg or diastolic  GREATER than 110 mm/Hg   []   Suspected infective endocarditis and aortic arch dissection   []   Current use of therapeutic treatment dose of low-molecular-weight heparin (LMWH) within the previous 24 hours   []   Structural GI malignancy or bleed   Relative exclusion for all patients  []   Only minor non-disabling symptoms   []   Pregnancy   []   Seizure at onset with postictal residual neurological impairments   []   Major surgery or previous trauma within past 14 days   []   History of previous spontaneous ICH, intracranial neoplasm, or AV malformation   []   Postpartum (within previous 14 days)   []   Recent GI or urinary tract hemorrhage (within previous 21 days)   []   Recent acute MI (within previous 3 months)   []   History of un-ruptured intracranial aneurysm LESS than 10 mm   []   History of ruptured intracranial aneurysm   []   Blood glucose LESS than 50 mg/dL (2.7 mmol/L)   []   Dural puncture  within the last 7 days   []   Known GREATER than 10 cerebral microbleeds   Additional exclusions for patients with symptoms onset between 3 and 4.5 hours.  []   Age > 80.   []   On any anticoagulants regardless of INR  >>> Warfarin (Coumadin), Heparin, Enoxaparin (Lovenox), fondaparinux (Arixtra), bivalirudin (Angiomax), Argatroban, dabigatran (Pradaxa), rivaroxaban (Xarelto), or apixaban (Eliquis)   []   Severe stroke (NIHSS > 25).   []   History of BOTH diabetes and previous ischemic stroke.   []   The risks and benefits have been discussed with the patient or family related to the administration of IV thrombolytic therapy for stroke symptoms.   []   I have discussed and reviewed the patient's case and imaging with the attending prior to IV thrombolytic therapy.   NA Time IV thrombolytic administered       Hospital Meds:  Scheduled-    Infusions-     PRNs-   sodium chloride    Functional Status Prior to Current Stroke/[place] Score:   MODIFIED GEOFF SCALE (to be assessed for each patient having history of stroke) []Stroke history but not assessed  []0: No symptoms at all  [x]1: No significant disability despite symptoms  []2: Slight disability  []3: Moderate disability  []4: Moderately severe disability  []5: Severe disability  []6: Death        NIH Stroke Scale  Time: 01:29 EDT  Person Administering Scale: SAFIA Quezada  Interval: baseline  1a. Level of Consciousness: 0-->Alert, keenly responsive  1b. LOC Questions: 0-->Answers both questions correctly  1c. LOC Commands: 0-->Performs both tasks correctly  2. Best Gaze: 0-->Normal  3. Visual: 0-->No visual loss  4. Facial Palsy: 0-->Normal symmetrical movements  5a. Motor Arm, Left: 0-->No drift, limb holds 90 (or 45) degrees for full 10 secs  5b. Motor Arm, Right: 0-->No drift, limb holds 90 (or 45) degrees for full 10 secs  6a. Motor Leg, Left: 0-->No drift, leg holds 30 degree position for full 5 secs  6b. Motor Leg, Right: 0-->No drift, leg holds 30  degree position for full 5 secs  7. Limb Ataxia: 0-->Absent  8. Sensory: 0-->Normal, no sensory loss  9. Best Language: 0-->No aphasia, normal  10. Dysarthria: 0-->Normal  11. Extinction and Inattention (formerly Neglect): 0-->No abnormality    Total (NIH Stroke Scale): 0     Results Reviewed:  I have personally reviewed current lab, radiology, and data and agree with results.    Results for orders placed during the hospital encounter of 06/30/22    Adult Transthoracic Echo Complete W/ Cont if Necessary Per Protocol    Interpretation Summary  ú Calculated left ventricular EF = 62.6%  ú Left ventricular wall thickness is consistent with moderate to severe septal asymmetric hypertrophy. Sigmoid-shaped ventricular septum is present  ú No LVOT obstruction.  ú Mild aortic valve stenosis is present. Mean gradient 23 mmHg dimensionless index 0.30. Relatively stable findings compared with 2020 echo  ú There is mild paravalvular aortic regurgitation noted. Pressure half-time 633 ms.     WBC   Date Value Ref Range Status   10/05/2023 7.87 3.40 - 10.80 10*3/mm3 Final     RBC   Date Value Ref Range Status   10/05/2023 4.23 3.77 - 5.28 10*6/mm3 Final     Hemoglobin   Date Value Ref Range Status   10/05/2023 12.3 12.0 - 15.9 g/dL Final     Hematocrit   Date Value Ref Range Status   10/05/2023 38.7 34.0 - 46.6 % Final     MCV   Date Value Ref Range Status   10/05/2023 91.5 79.0 - 97.0 fL Final     MCH   Date Value Ref Range Status   10/05/2023 29.1 26.6 - 33.0 pg Final     MCHC   Date Value Ref Range Status   10/05/2023 31.8 31.5 - 35.7 g/dL Final     RDW   Date Value Ref Range Status   10/05/2023 12.9 12.3 - 15.4 % Final     RDW-SD   Date Value Ref Range Status   10/05/2023 42.7 37.0 - 54.0 fl Final     MPV   Date Value Ref Range Status   10/05/2023 11.3 6.0 - 12.0 fL Final     Platelets   Date Value Ref Range Status   10/05/2023 195 140 - 450 10*3/mm3 Final     Neutrophil %   Date Value Ref Range Status   10/05/2023 83.3 (H)  42.7 - 76.0 % Final     Lymphocyte %   Date Value Ref Range Status   10/05/2023 10.4 (L) 19.6 - 45.3 % Final     Monocyte %   Date Value Ref Range Status   10/05/2023 4.1 (L) 5.0 - 12.0 % Final     Eosinophil %   Date Value Ref Range Status   10/05/2023 1.1 0.3 - 6.2 % Final     Basophil %   Date Value Ref Range Status   10/05/2023 0.6 0.0 - 1.5 % Final     Immature Grans %   Date Value Ref Range Status   10/05/2023 0.5 0.0 - 0.5 % Final     Neutrophils, Absolute   Date Value Ref Range Status   10/05/2023 6.55 1.70 - 7.00 10*3/mm3 Final     Lymphocytes, Absolute   Date Value Ref Range Status   10/05/2023 0.82 0.70 - 3.10 10*3/mm3 Final     Monocytes, Absolute   Date Value Ref Range Status   10/05/2023 0.32 0.10 - 0.90 10*3/mm3 Final     Eosinophils, Absolute   Date Value Ref Range Status   10/05/2023 0.09 0.00 - 0.40 10*3/mm3 Final     Basophils, Absolute   Date Value Ref Range Status   10/05/2023 0.05 0.00 - 0.20 10*3/mm3 Final     Immature Grans, Absolute   Date Value Ref Range Status   10/05/2023 0.04 0.00 - 0.05 10*3/mm3 Final     nRBC   Date Value Ref Range Status   10/05/2023 0.0 0.0 - 0.2 /100 WBC Final      Lab Results   Component Value Date    GLUCOSE 198 (H) 10/05/2023    BUN 18 10/05/2023    CREATININE 0.75 10/05/2023    EGFR 79.1 10/05/2023    BCR 24.0 10/05/2023    K 4.0 10/05/2023    CO2 25.0 10/05/2023    CALCIUM 9.3 10/05/2023    ALBUMIN 3.9 10/05/2023    BILITOT 0.2 10/05/2023    AST 16 10/05/2023    ALT 9 10/05/2023      CT Angiogram Neck    Result Date: 10/6/2023  Impression: No acute large vessel occlusion, stenosis, aneurysm or vascular malformation. Electronically Signed: Ricco Wisdmo MD  10/6/2023 2:32 AM EDT  Workstation ID: [id number]    CT Head Without Contrast Stroke Protocol    Result Date: 10/6/2023  Impression: Mild atrophy and chronic microvascular ischemia. No acute intracranial process. Electronically Signed: Ricco Wisdom MD  10/6/2023 2:06 AM EDT  Workstation ID: [id number]    CT  Angiogram Head w AI Analysis of LVO    Result Date: 10/6/2023  Impression: No acute large vessel occlusion, stenosis, aneurysm or vascular malformation. Electronically Signed: Ricco Wisdom MD  10/6/2023 2:32 AM EDT  Workstation ID: [id number]    CT CEREBRAL PERFUSION WITH & WITHOUT CONTRAST    Result Date: 10/6/2023  Impression: Normal CT perfusion Electronically Signed: Ricco Wisdom MD  10/6/2023 2:26 AM EDT  Workstation ID: [id number]      Assessment/Plan:  Ms. Angela is a 83-year-old female with PMH of HTN and s/p aortic valve replacement with bioprosthetic valve.  She presents to [facility] ED with new onset of back pain, nausea and right upper extremity weakness.  Patient reports while at dinner she experienced new onset of neck and back pain.  In addition Ms. Angela has been nauseous with episodes of fecal incontinence.  She arrived by POV to [facility] ED for further evaluation of presenting symptoms. Patient is not a candidate for IV thrombolytic therapy due to last known well greater than 4.5 hours.  Patient is not a candidate for endovascular therapy due to no LVO on CT scans.    Antiplatelet PTA: Aspirin  Anticoagulant PTA: None        Right upper extremity weakness  Differentials to include TIA, CVA, metabolic encephalopathy  Initiate TIA/CVA without IV thrombolytic therapy order set  N.p.o. until bedside dysphagia screening completed by RN  Activity as tolerated  Aspirin 81 mg p.o. daily  Atorvastatin 80 mg p.o. nightly  MRI brain without contrast routine  TTE routine  A1c, lipid panel routine  Blood pressure goals, SBP less than 220  Diabetes educator to see if appropriate  PT/OT/SLP eval and treat  Case management to follow    Plan of care discussed with Dr. Elmore, patient and bedside RN.  Stroke neurology will continue to follow.  Thank you for this consult.  Call with any questions or concerns.      Ben Dye, APRN  October 6, 2023  01:29 EDT

## 2023-10-06 NOTE — PROGRESS NOTES
[facility] Medicine Services  ADMISSION FOLLOW-UP NOTE          Patient admitted after midnight, H&P by my partner performed earlier on today's date reviewed.  Interim findings, labs, and charting also reviewed.        [facility] Hospital Problem List has been managed and updated to include any new diagnoses:  Active Hospital Problems    Diagnosis  POA    **Hypertensive urgency [I16.0]  Yes      Resolved Hospital Problems   No resolved problems to display.     Patient is an 83-year-old admitted with severe upper back pain after eating red beans and rice at a restaurant.  Before she left the restaurant she had severe pain.  She had difficulty ambulating.  The symptoms were associated with nausea and diarrhea.  She presented to the emergency department for evaluation.  She complained of difficulty with her arms complaining of weakness and discomfort.  For this reason stroke was considered and CT a imaging of her head and neck was performed.  This was essentially normal.  Her symptoms have now almost completely resolved and her pain is a 2 out of 10.  Given the nature of her pain troponins were also checked which were flat.  Lipase was normal.  Her blood pressure was significantly elevated with systolic up to 221 and diastolic up to 137.  Her symptoms improved after being given morphine.  Subsequently she was started on a Cardene drip however blood pressure normalized and she became hypotensive.  Cardene drip was then stopped.  Of note her son is at bedside and states he googled her symptoms and thought she was having a spinal stroke.    ADDITIONAL PLAN:  - detailed assessment and plan from admission reviewed  -Troponins flat, imaging reassuring  -Continue observation  -Largely symptoms have resolved  -Blood pressure has normalized  -Diet as tolerated  -Anticipate discharge home tomorrow    Expected Discharge   Expected Discharge Date: 10/7/2023; Expected Discharge Time:      Beth Warner  MD  10/06/23

## 2023-10-06 NOTE — THERAPY EVALUATION
Patient Name: Aretha Angela  : 1940    MRN: [id number]                              Today's Date: 10/6/2023       Admit Date: 10/5/2023    Visit Dx:     ICD-10-CM ICD-9-CM   1. Weakness of right upper extremity  R29.898 729.89   2. Hypertensive urgency  I16.0 401.9   3. Acute midline thoracic back pain  M54.6 724.1   4. Atypical chest pain  R07.89 786.59   5. Diarrhea, unspecified type  R19.7 787.91   6. Incontinence of feces, unspecified fecal incontinence type  R15.9 787.60   7. Elevated d-dimer  R79.89 790.92     Patient Active Problem List   Diagnosis    S/P aortic valve replacement with bioprosthetic valve    Hypertensive urgency     Past Medical History:   Diagnosis Date    Chronic gastric ulcer     Dysphagia     GERD (gastroesophageal reflux disease)     Hypertension     Nausea      Past Surgical History:   Procedure Laterality Date    AORTIC VALVE REPAIR/REPLACEMENT      HYSTERECTOMY      KNEE ARTHROSCOPY        General Information       Row Name 10/06/23 1030          OT Time and Intention    Document Type evaluation  -KF     Mode of Treatment occupational therapy;individual therapy  -KF       Row Name 10/06/23 1030          General Information    Patient Profile Reviewed yes  -KF     Prior Level of Function independent:;all household mobility;community mobility;transfer;bed mobility;ADL's;max assist:;home management;cleaning  Pt states she is very active, completing aerobics and biking weekly. Pt states she does not use an AD at baseline.  -KF     Existing Precautions/Restrictions fall  Urinary incontinence; Questionable historian  -KF     Barriers to Rehab previous functional deficit;cognitive status  -KF       Row Name 10/06/23 1030          Living Environment    People in Home alone  -KF       Row Name 10/06/23 1030          Home Main Entrance    Number of Stairs, Main Entrance six  -KF     Stair Railings, Main Entrance none  -KF       Row Name 10/06/23 1030          Stairs Within Home,  Primary    Number of Stairs, Within Home, Primary other (see comments)  15 steps with L rail to art studio  -     Stair Railings, Within Home, Primary railing on left side (ascending)  -     Stairs Comment, Within Home, Primary Tub shower, no seat available. Standard height commode.  -       Row Name 10/06/23 1030          Cognition    Orientation Status (Cognition) oriented x 4;other (see comments)  Conversational confusion  -       Row Name 10/06/23 1030          Safety Issues, Functional Mobility    Safety Issues Affecting Function (Mobility) awareness of need for assistance;impulsivity;insight into deficits/self-awareness;judgment;positioning of assistive device;problem-solving;safety precaution awareness;safety precautions follow-through/compliance;sequencing abilities  -     Impairments Affecting Function (Mobility) balance;cognition;endurance/activity tolerance;pain;strength  -KF     Cognitive Impairments, Mobility Safety/Performance awareness, need for assistance;impulsivity;insight into deficits/self-awareness;judgment;problem-solving/reasoning;safety precaution awareness;safety precaution follow-through;sequencing abilities  -KF               User Key  (r) = Recorded By, (t) = Taken By, (c) = Cosigned By      Initials Name Provider Type    KF Gogo Hernandez OT Occupational Therapist                     Mobility/ADL's       Row Name 10/06/23 1035          Bed Mobility    Bed Mobility supine-sit;sit-supine  -     Supine-Sit [place] (Bed Mobility) minimum assist (75% patient effort);verbal cues  -     Sit-Supine [place] (Bed Mobility) standby assist;verbal cues  -     Assistive Device (Bed Mobility) bed rails;head of bed elevated  -     Comment, (Bed Mobility) Verbal cues for sequencing and hand placement.  -       Row Name 10/06/23 1035          Transfers    Transfers sit-stand transfer;stand-sit transfer;toilet transfer  -     Comment, (Transfers) Verbal and tactile cues  for sequencing, hand placement, and safety awareness  -       Row Name 10/06/23 1035          Sit-Stand Transfer    Sit-Stand [place] (Transfers) contact guard;1 person assist;verbal cues;nonverbal cues (demo/gesture)  -     Assistive Device (Sit-Stand Transfers) walker, front-wheeled  -     Comment, (Sit-Stand Transfer) STS x2 from bed. Initial stand Johnny with no AD, second stand using RWx with CGA  -       Row Name 10/06/23 1035          Stand-Sit Transfer    Stand-Sit [place] (Transfers) contact guard;1 person assist;verbal cues;nonverbal cues (demo/gesture)  -     Assistive Device (Stand-Sit Transfers) walker, front-wheeled  -       Row Name 10/06/23 1035          Toilet Transfer    Type (Toilet Transfer) sit-stand;stand-sit  -     [place] Level (Toilet Transfer) contact guard;1 person assist;verbal cues;nonverbal cues (demo/gesture)  -     Assistive Device (Toilet Transfer) walker, front-wheeled;raised toilet seat;grab bars/safety frame  -       Row Name 10/06/23 1035          Functional Mobility    Functional Mobility- Ind. Level minimum assist (75% patient effort);1 person;verbal cues required;nonverbal cues required (demo/gesture)  -     Functional Mobility- Device walker, front-wheeled  -     Functional Mobility-Distance (Feet) --  In room ambulation for ADLs  -     Functional Mobility- Safety Issues balance decreased during turns;sequencing ability decreased  -     Functional Mobility- Comment Verbal and tactile cues for RWx management and safety. Pt with complaints of dizziness upon return to the bathroom, vitals recorded below.  -       Row Name 10/06/23 1035          Activities of Daily Living    BADL Assessment/Intervention upper body dressing;lower body dressing;grooming;toileting  -       Row Name 10/06/23 1035          Upper Body Dressing Assessment/Training    [place] Level (Upper Body Dressing) doff;don;other (see comments);minimum assist (75%  patient effort)  hospital gown  -KF     Position (Upper Body Dressing) edge of bed sitting  -       Row Name 10/06/23 1035          Lower Body Dressing Assessment/Training    [place] Level (Lower Body Dressing) doff;don;socks;contact guard assist  -KF     Position (Lower Body Dressing) edge of bed sitting  -KF       Row Name 10/06/23 1035          Grooming Assessment/Training    [place] Level (Grooming) grooming skills;wash face, hands;set up  -KF     Position (Grooming) edge of bed sitting  -       Row Name 10/06/23 1035          Toileting Assessment/Training    [place] Level (Toileting) toileting skills;adjust/manage clothing;perform perineal hygiene;contact guard assist  -     Assistive Devices (Toileting) commode;raised toilet seat;grab bar/safety frame  -     Position (Toileting) unsupported sitting;supported standing  -KF               User Key  (r) = Recorded By, (t) = Taken By, (c) = Cosigned By      Initials Name Provider Type    KF Gogo Hernandez OT Occupational Therapist                   Obj/Interventions       Row Name 10/06/23 1039          Sensory Assessment (Somatosensory)    Sensory Assessment (Somatosensory) UE sensation intact  -       Row Name 10/06/23 1039          Vision Assessment/Intervention    Visual Impairment/Limitations WFL  -       Row Name 10/06/23 1039          Range of Motion Comprehensive    General Range of Motion bilateral upper extremity ROM WFL  -       Row Name 10/06/23 1039          Strength Comprehensive (MMT)    General Manual Muscle Testing (MMT) Assessment upper extremity strength deficits identified  -KF     Comment, General Manual Muscle Testing (MMT) Assessment BUE grossly 4/5  -       Row Name 10/06/23 1039          Balance    Balance Assessment sitting static balance;sitting dynamic balance;standing static balance;standing dynamic balance  -KF     Static Sitting Balance standby assist  -     Dynamic Sitting Balance contact guard  -      Position, Sitting Balance unsupported;sitting edge of bed  -KF     Static Standing Balance contact guard;1-person assist  -KF     Dynamic Standing Balance minimal assist;1-person assist  -KF     Position/Device Used, Standing Balance supported;walker, front-wheeled  -KF     Balance Interventions sitting;standing;supported;static;dynamic;occupation based/functional task  -KF     Comment, Balance No overt LOB, though pt unsteady throughout session  -KF               User Key  (r) = Recorded By, (t) = Taken By, (c) = Cosigned By      Initials Name Provider Type    KF Gogo Hernandez, OT Occupational Therapist                   Goals/Plan       Row Name 10/06/23 1046          Transfer Goal 1 (OT)    Activity/Assistive Device (Transfer Goal 1, OT) commode;bed-to-chair/chair-to-bed  -KF     [place] Level/Cues Needed (Transfer Goal 1, OT) modified independence  -KF     Time Frame (Transfer Goal 1, OT) long term goal (LTG);10 days  -KF     Progress/Outcome (Transfer Goal 1, OT) goal ongoing  -KF       Row Name 10/06/23 1046          Dressing Goal 1 (OT)    Activity/Device (Dressing Goal 1, OT) dressing skills, all  -KF     [place]/Cues Needed (Dressing Goal 1, OT) modified independence  -KF     Time Frame (Dressing Goal 1, OT) long term goal (LTG);10 days  -KF     Progress/Outcome (Dressing Goal 1, OT) goal ongoing  -KF       Row Name 10/06/23 1046          Grooming Goal 1 (OT)    Activity/Device (Grooming Goal 1, OT) grooming skills, all  -KF     [place] (Grooming Goal 1, OT) modified independence  -KF     Time Frame (Grooming Goal 1, OT) long term goal (LTG);10 days  -KF     Progress/Outcome (Grooming Goal 1, OT) goal ongoing  -KF       Row Name 10/06/23 1046          Strength Goal 1 (OT)    Strength Goal 1 (OT) Pt will increase BUE strength to 5/5 to faciliate greater independence and safety during ADLs.  -KF     Time Frame (Strength Goal 1, OT) long term goal (LTG);10 days  -KF     Progress/Outcome  (Strength Goal 1, OT) goal ongoing  -       Row Name 10/06/23 1046          Therapy Assessment/Plan (OT)    Planned Therapy Interventions (OT) activity tolerance training;adaptive equipment training;BADL retraining;functional balance retraining;IADL retraining;occupation/activity based interventions;patient/caregiver education/training;ROM/therapeutic exercise;strengthening exercise;transfer/mobility retraining  -               User Key  (r) = Recorded By, (t) = Taken By, (c) = Cosigned By      Initials Name Provider Type    Gogo Fuller OT Occupational Therapist                   Clinical Impression       Row Name 10/06/23 1040          Pain Assessment    Pretreatment Pain Rating 0/10 - no pain  -KF     Posttreatment Pain Rating 0/10 - no pain  -KF     Pain Intervention(s) Repositioned;Ambulation/increased activity  -       Row Name 10/06/23 1040          Plan of Care Review    Plan of Care Reviewed With patient  -     Progress no change  -     Outcome Evaluation OT evaluation completed. Pt presents below her functional baseline with generalized weakness, decreased activity tolerance, balance deficits, and decreased safety awareness. Pt ambulated to/from the bathroom using a RWx with Johnny. Pt reported dizziness during ambulation, BP taken and found 102/64. ADLs completed with CGA/Johnny for safety. Pt will benefit from continued OT services during admission to increase safety and independence during ADLs and functional mobility. Recommend a d/c to IRF given pt's PLOF and home set up.  -       Row Name 10/06/23 1040          Therapy Assessment/Plan (OT)    Rehab Potential (OT) good, to achieve stated therapy goals  -     Criteria for Skilled Therapeutic Interventions Met (OT) yes;skilled treatment is necessary  -     Therapy Frequency (OT) daily  -       Row Name 10/06/23 1040          Therapy Plan Review/Discharge Plan (OT)    Anticipated Discharge Disposition (OT) inpatient rehabilitation  facility  -KF       Row Name 10/06/23 1040          Vital Signs    Pre Systolic BP Rehab 112  Supine  -KF     Pre Treatment Diastolic BP 58  -KF     Intra Systolic BP Rehab 102  Post ambulation from the bathroom with complaints of dizziness  -KF     Intra Treatment Diastolic BP 64  -KF     Post Systolic BP Rehab 99  Supine  -KF     Post Treatment Diastolic BP 62  -KF     Pretreatment Heart Rate (beats/min) 68  -KF     Posttreatment Heart Rate (beats/min) 55  -KF     Pre SpO2 (%) 95  -KF     O2 Delivery Pre Treatment room air  -KF     Post SpO2 (%) 96  -KF     O2 Delivery Post Treatment room air  -KF     Pre Patient Position Supine  -KF     Intra Patient Position Standing  -KF     Post Patient Position Supine  -KF       Row Name 10/06/23 1040          Positioning and Restraints    Pre-Treatment Position in bed  -KF     Post Treatment Position bed  -KF     In Bed notified nsg;supine;call light within reach;encouraged to call for assist;exit alarm on;SCD pump applied;with other staff  -KF               User Key  (r) = Recorded By, (t) = Taken By, (c) = Cosigned By      Initials Name Provider Type    Gogo Fuller OT Occupational Therapist                   Outcome Measures       Row Name 10/06/23 1047          How much help from another is currently needed...    Putting on and taking off regular lower body clothing? 3  -KF     Bathing (including washing, rinsing, and drying) 3  -KF     Toileting (which includes using toilet bed pan or urinal) 3  -KF     Putting on and taking off regular upper body clothing 3  -KF     Taking care of personal grooming (such as brushing teeth) 3  -KF     Eating meals 4  -KF     AM-PAC 6 Clicks Score (OT) 19  -KF       Row Name 10/06/23 1047          Functional Assessment    Outcome Measure Options AM-PAC 6 Clicks Daily Activity (OT)  -KF               User Key  (r) = Recorded By, (t) = Taken By, (c) = Cosigned By      Initials Name Provider Type    Gogo Fuller OT  Occupational Therapist                    Occupational Therapy Education       Title: PT OT SLP Therapies (In Progress)       Topic: Occupational Therapy (In Progress)       Point: ADL training (Done)       Description:   Instruct learner(s) on proper safety adaptation and remediation techniques during self care or transfers.   Instruct in proper use of assistive devices.                  Learning Progress Summary             Patient Acceptance, E,TB, VU by  at 10/6/2023 0853                         Point: Home exercise program (Not Started)       Description:   Instruct learner(s) on appropriate technique for monitoring, assisting and/or progressing therapeutic exercises/activities.                  Learner Progress:  Not documented in this visit.              Point: Precautions (Done)       Description:   Instruct learner(s) on prescribed precautions during self-care and functional transfers.                  Learning Progress Summary             Patient Acceptance, E,TB, VU by  at 10/6/2023 0853                         Point: Body mechanics (Done)       Description:   Instruct learner(s) on proper positioning and spine alignment during self-care, functional mobility activities and/or exercises.                  Learning Progress Summary             Patient Acceptance, E,TB, VU by  at 10/6/2023 0853                                         User Key       Initials Effective Dates Name Provider Type Discipline     08/09/23 -  Gogo Hernandez OT Occupational Therapist OT                  OT Recommendation and Plan  Planned Therapy Interventions (OT): activity tolerance training, adaptive equipment training, BADL retraining, functional balance retraining, IADL retraining, occupation/activity based interventions, patient/caregiver education/training, ROM/therapeutic exercise, strengthening exercise, transfer/mobility retraining  Therapy Frequency (OT): daily  Plan of Care Review  Plan of Care Reviewed With:  patient  Progress: no change  Outcome Evaluation: OT evaluation completed. Pt presents below her functional baseline with generalized weakness, decreased activity tolerance, balance deficits, and decreased safety awareness. Pt ambulated to/from the bathroom using a RWx with Johnny. Pt reported dizziness during ambulation, BP taken and found 102/64. ADLs completed with CGA/Johnny for safety. Pt will benefit from continued OT services during admission to increase safety and independence during ADLs and functional mobility. Recommend a d/c to IRF given pt's PLOF and home set up.     Time Calculation:   Evaluation Complexity (OT)  Review Occupational Profile/Medical/Therapy History Complexity: brief/low complexity  Assessment, Occupational Performance/Identification of Deficit Complexity: 1-3 performance deficits  Clinical Decision Making Complexity (OT): problem focused assessment/low complexity  Overall Complexity of Evaluation (OT): low complexity     Time Calculation- OT       Row Name 10/06/23 1048             Time Calculation- OT    OT Start Time 0853  -KF      OT Received On 10/06/23  -KF      OT Goal Re-Cert Due Date 10/16/23  -KF         Timed Charges    95388 - OT Self Care/Mgmt Minutes 12  -KF         Untimed Charges    OT Eval/Re-eval Minutes 52  -KF         Total Minutes    Timed Charges Total Minutes 12  -KF      Untimed Charges Total Minutes 52  -KF       Total Minutes 64  -KF                User Key  (r) = Recorded By, (t) = Taken By, (c) = Cosigned By      Initials Name Provider Type    Gogo Fuller OT Occupational Therapist                  Therapy Charges for Today       Code Description Service Date Service Provider Modifiers Qty    [id number]  OT EVAL LOW COMPLEXITY 4 10/6/2023 Gogo Hernandez OT GO 1    [id number]  OT SELF CARE/MGMT/TRAIN EA 15 MIN 10/6/2023 Gogo Hernandez OT GO 1                 Gogo Hernandez OT  10/6/2023

## 2023-10-06 NOTE — PLAN OF CARE
Goal Outcome Evaluation:  Plan of Care Reviewed With: patient, son         SLP evaluation completed. Will sign-off. Please see note for further details and recommendations.

## 2023-10-07 LAB
QT INTERVAL: 384 MS
QT INTERVAL: 412 MS
QTC INTERVAL: 438 MS
QTC INTERVAL: 440 MS

## 2023-10-07 PROCEDURE — 97116 GAIT TRAINING THERAPY: CPT

## 2023-10-07 PROCEDURE — G0378 HOSPITAL OBSERVATION PER HR: HCPCS

## 2023-10-07 PROCEDURE — 99232 SBSQ HOSP IP/OBS MODERATE 35: CPT | Performed by: STUDENT IN AN ORGANIZED HEALTH CARE EDUCATION/TRAINING PROGRAM

## 2023-10-07 PROCEDURE — 97162 PT EVAL MOD COMPLEX 30 MIN: CPT

## 2023-10-07 RX ADMIN — HYDROCODONE BITARTRATE AND ACETAMINOPHEN 1 TABLET: 5; 325 TABLET ORAL at 18:21

## 2023-10-07 RX ADMIN — HYDROCODONE BITARTRATE AND ACETAMINOPHEN 1 TABLET: 5; 325 TABLET ORAL at 21:53

## 2023-10-07 RX ADMIN — ASPIRIN 81 MG: 81 TABLET, COATED ORAL at 08:23

## 2023-10-07 RX ADMIN — ACETAMINOPHEN 650 MG: 325 TABLET ORAL at 08:23

## 2023-10-07 RX ADMIN — Medication 10 ML: at 08:23

## 2023-10-07 RX ADMIN — ACETAMINOPHEN 650 MG: 325 TABLET ORAL at 02:26

## 2023-10-07 RX ADMIN — ATORVASTATIN CALCIUM 80 MG: 40 TABLET, FILM COATED ORAL at 20:27

## 2023-10-07 RX ADMIN — METOPROLOL SUCCINATE 50 MG: 50 TABLET, EXTENDED RELEASE ORAL at 08:23

## 2023-10-07 NOTE — PLAN OF CARE
"MRI completed, neuro to assess and review results with patient. Gait unsteady pt states \"I feel like I'm drunk.\" AxOx4, VSS, room air, NSR, forgetful at times. Pt reports not taking home BP medication regularly at times. C/o right shoulder pain prn tylenol given twice and norco x 1 this shift. ECHO pending.       Goal Outcome Evaluation:                        "

## 2023-10-07 NOTE — PLAN OF CARE
Goal Outcome Evaluation:  Plan of Care Reviewed With: patient           Outcome Evaluation: PT PRESENTS WITH EVOLVING SYMPTOMS TO INCLUDE IMPAIRED BALANCE, GENERALIZED WEAKNESS, DECREASED SAFETY AWARENESS, CONVERSATIONAL CONFUSION AND C/O R SHOULDER PAIN. GIVEN CONDITION OF PERSONAL CLOTHES AND SHOES AND PT REPORTS OF DOGS CHEWING UP ALL HER SHOES, HAVE CONCERNS RE: HOME ENVIRONMENT.  RELAYED CONCERNS TO NSG AND MD. PT COMPLETED TRANSFERS WITH CGA AND GAIT WITH CGA/MIN ASSIST X 250 FEET. PT DEMONSRATES UNSTEADY GAIT WITH WIDE GEMMA AND FLEXED POSTURE AND HAS POOR INSIGHT INTO DEFICITS. RECOMMENT IRF AT D/C AS PT LIVES ALONE AND MUST NAVIGATE STAIRS.      Anticipated Discharge Disposition (PT): inpatient rehabilitation facility

## 2023-10-07 NOTE — PLAN OF CARE
Problem: Fall Injury Risk  Goal: Absence of Fall and Fall-Related Injury  Outcome: Ongoing, Progressing  Intervention: Identify and Manage Contributors  Recent Flowsheet Documentation  Taken 10/7/2023 1628 by Venecia Willis RN  Self-Care Promotion: independence encouraged  Taken 10/7/2023 1437 by Venecia Willis RN  Self-Care Promotion: independence encouraged  Taken 10/7/2023 1222 by Venecia Willis RN  Self-Care Promotion: independence encouraged  Taken 10/7/2023 1011 by Venecia Willis RN  Self-Care Promotion: independence encouraged  Taken 10/7/2023 0823 by Venecia Willis RN  Medication Review/Management: medications reviewed  Self-Care Promotion: independence encouraged  Intervention: Promote Injury-Free Environment  Recent Flowsheet Documentation  Taken 10/7/2023 1628 by Venecia Willis RN  Safety Promotion/Fall Prevention:   activity supervised   assistive device/personal items within reach   safety round/check completed  Taken 10/7/2023 1437 by Venecia Willis RN  Safety Promotion/Fall Prevention:   activity supervised   assistive device/personal items within reach   safety round/check completed  Taken 10/7/2023 1222 by Venecia Willis RN  Safety Promotion/Fall Prevention:   activity supervised   assistive device/personal items within reach   safety round/check completed  Taken 10/7/2023 1011 by Venecia Willis RN  Safety Promotion/Fall Prevention:   activity supervised   assistive device/personal items within reach   safety round/check completed  Taken 10/7/2023 0823 by Venecia Willis RN  Safety Promotion/Fall Prevention:   activity supervised   assistive device/personal items within reach   clutter free environment maintained   fall prevention program maintained   lighting adjusted   muscle strengthening facilitated   nonskid shoes/slippers when out of bed   room organization consistent   safety round/check completed     Problem: Skin Injury Risk Increased  Goal: Skin Health and  Integrity  Outcome: Ongoing, Progressing  Intervention: Optimize Skin Protection  Recent Flowsheet Documentation  Taken 10/7/2023 1628 by Venecia Willis RN  Head of Bed (HOB) Positioning: HOB elevated  Taken 10/7/2023 1437 by Venecia Willis RN  Head of Bed (HOB) Positioning: HOB elevated  Taken 10/7/2023 1222 by Venecia Willis RN  Head of Bed (HOB) Positioning: HOB elevated  Taken 10/7/2023 1011 by Venecia Willis RN  Head of Bed (HOB) Positioning: HOB elevated  Taken 10/7/2023 0823 by Venecia Willis RN  Pressure Reduction Techniques: frequent weight shift encouraged  Head of Bed (HOB) Positioning: HOB elevated  Pressure Reduction Devices: pressure-redistributing mattress utilized  Skin Protection:   adhesive use limited   incontinence pads utilized     Problem: Hypertension Comorbidity  Goal: Blood Pressure in Desired Range  Outcome: Ongoing, Progressing  Intervention: Maintain Blood Pressure Management  Recent Flowsheet Documentation  Taken 10/7/2023 0823 by Venecia Willis RN  Medication Review/Management: medications reviewed     Problem: Adult Inpatient Plan of Care  Goal: Plan of Care Review  Outcome: Ongoing, Progressing  Flowsheets (Taken 10/7/2023 1711)  Progress: improving  Plan of Care Reviewed With: patient     Problem: Adjustment to Illness (Stroke, Ischemic/Transient Ischemic Attack)  Goal: Optimal Coping  Outcome: Ongoing, Progressing  Intervention: Support Psychosocial Response to Stroke  Recent Flowsheet Documentation  Taken 10/7/2023 0823 by Venecia Willis RN  Family/Support System Care:   self-care encouraged   support provided     Problem: Bowel Elimination Impaired (Stroke, Ischemic/Transient Ischemic Attack)  Goal: Effective Bowel Elimination  Outcome: Ongoing, Progressing     Problem: Cerebral Tissue Perfusion (Stroke, Ischemic/Transient Ischemic Attack)  Goal: Optimal Cerebral Tissue Perfusion  Outcome: Ongoing, Progressing     Problem: Cognitive Impairment (Stroke,  Ischemic/Transient Ischemic Attack)  Goal: Optimal Cognitive Function  Outcome: Ongoing, Progressing     Problem: Communication Impairment (Stroke, Ischemic/Transient Ischemic Attack)  Goal: Improved Communication Skills  Outcome: Ongoing, Progressing     Problem: Functional Ability Impaired (Stroke, Ischemic/Transient Ischemic Attack)  Goal: Optimal Functional Ability  Outcome: Ongoing, Progressing  Intervention: Optimize Functional Ability  Recent Flowsheet Documentation  Taken 10/7/2023 1706 by Venecia Willis RN  Activity Management: ambulated to bathroom  Taken 10/7/2023 1628 by Venecia Willis RN  Activity Management: activity encouraged  Self-Care Promotion: independence encouraged  Taken 10/7/2023 1437 by Venecia Willis RN  Activity Management: up in chair  Self-Care Promotion: independence encouraged  Taken 10/7/2023 1319 by Venecia Willis RN  Activity Management: ambulated to bathroom  Taken 10/7/2023 1222 by Venecia Willis RN  Activity Management: up in chair  Self-Care Promotion: independence encouraged  Taken 10/7/2023 1011 by Venecia Willis RN  Activity Management: activity encouraged  Self-Care Promotion: independence encouraged  Taken 10/7/2023 0853 by Venecia Willis RN  Activity Management: ambulated outside room  Taken 10/7/2023 0823 by Venecia Willis RN  Activity Management: activity encouraged  Self-Care Promotion: independence encouraged     Problem: Respiratory Compromise (Stroke, Ischemic/Transient Ischemic Attack)  Goal: Effective Oxygenation and Ventilation  Outcome: Ongoing, Progressing  Intervention: Optimize Oxygenation and Ventilation  Recent Flowsheet Documentation  Taken 10/7/2023 1628 by Venecia Willis RN  Head of Bed (HOB) Positioning: HOB elevated  Taken 10/7/2023 1437 by Venecia Willis RN  Head of Bed (HOB) Positioning: HOB elevated  Taken 10/7/2023 1222 by Venecia Willis RN  Head of Bed (HOB) Positioning: HOB elevated  Taken 10/7/2023 1011 by Venecia Willis  RN  Head of Bed (HOB) Positioning: HOB elevated  Taken 10/7/2023 0823 by Venecia Willis RN  Head of Bed (HOB) Positioning: HOB elevated     Problem: Sensorimotor Impairment (Stroke, Ischemic/Transient Ischemic Attack)  Goal: Improved Sensorimotor Function  Outcome: Ongoing, Progressing  Intervention: Optimize Range of Motion, Motor Control and Function  Recent Flowsheet Documentation  Taken 10/7/2023 1628 by Veneica Willis RN  Positioning/Transfer Devices:   pillows   in use  Taken 10/7/2023 1437 by Venecia Willis RN  Positioning/Transfer Devices:   pillows   in use  Taken 10/7/2023 1222 by Venecia Willis RN  Positioning/Transfer Devices:   pillows   in use  Taken 10/7/2023 1011 by Venecia Willis RN  Positioning/Transfer Devices:   pillows   in use  Taken 10/7/2023 0823 by Venecia Willis RN  Positioning/Transfer Devices:   pillows   in use  Intervention: Optimize Sensory and Perceptual Ability  Recent Flowsheet Documentation  Taken 10/7/2023 0823 by Venecia Willis RN  Pressure Reduction Techniques: frequent weight shift encouraged  Pressure Reduction Devices: pressure-redistributing mattress utilized     Problem: Swallowing Impairment (Stroke, Ischemic/Transient Ischemic Attack)  Goal: Optimal Eating and Swallowing without Aspiration  Outcome: Ongoing, Progressing     Problem: Urinary Elimination Impaired (Stroke, Ischemic/Transient Ischemic Attack)  Goal: Effective Urinary Elimination  Outcome: Ongoing, Progressing   Goal Outcome Evaluation:  Plan of Care Reviewed With: patient      Pt currently in chair resting quietly. Complaints of pain to right shoulder alleviated with ice and mar. Pt ambulatory with 1 assist and gait belt. Neuro checks WNL. NIH of 0. VSS. Plans for specialty consult. Pt desires to go home. No other observations at this time. Call bell in reach.  Progress: improving

## 2023-10-07 NOTE — PROGRESS NOTES
Stroke Progress Note       Chief Complaint: Punctate right middle lobe infarct    Subjective     Subjective:  Aretha Angela was seen and examined at bedside alone.  Results of MRI were discussed.  All questions and concerns were answered.       Objective      Temp:  [97 øF (36.1 øC)-98.2 øF (36.8 øC)] 97.5 øF (36.4 øC)  Heart Rate:  [48-59] 52  Resp:  [16-18] 18  BP: (127-176)/() 176/75            Objective    Physical Exam:  General Appearance: Alert  Eyes: Anicteric sclera  HEENT: no scleral injection  Lungs: respirations appear comfortable, no obvious increased work of breathing  Extremities: No cyanosis or fingernail clubbing   Skin: No rashes in exposed skin areas     Neurological Examination:   Mental status: Alert and oriented to person, place, and time. Speech with no dysarthria, able to name and repeat with no difficulty.    Cranial Nerves: Visual fields intact. Extraocular movements are intact with no nystagmus. Facial sensation intact. Face symmetrical. Hearing grossly intact. Palate movement is symmetric. Full shoulder shrug bilaterally. Tongue protrudes midline.   Sensory: Sensory exam to light touch in all four extremities distally is normal. Double simultaneous sensory stimulation shows no extinction  Motor: Normal tone throughout. Normal bulk. Pronator drift is absent.  Left upper extremity: 5/5 deltoid, tricep, bicep, interosseous, hand .   Right upper extremity: 5/5 deltoid, tricep, bicep, interosseous, hand .   Left lower extremity: 5/5 iliopsoas, knee extension/flexion, foot dorsi/plantarflexion.  Right lower extremity: 5/5 iliopsoas, knee extension/flexion, foot dorsi/plantarflexion.  Cerebellar: Finger-to-nose intact. Heel-to-shin intact. Rapid alternating movements are intact.   Gait: Not assessed.       Results Review:      CT head 10/6/2023.  Impression: Mild atrophy and chronic microvascular ischemia.  No acute intracranial process.    CT angiogram head and neck 10/6/2023.   Impression: No acute large vessel occlusion, stenoses, aneurysm, or vascular malformation.    MRI brain without contrast 10/6/2023.  Impression: 1. Evidence for a punctate focus of acute/subacute ischemic change within the periventricular white matter adjacent to the posterior horn of the right lateral ventricle at the posterior aspect of the right frontal lobe. 2. Extensive diffuse nonspecific white matter signal changes are noted with associated diffuse volume loss. These findings are most consistent with extensive chronic small vessel ischemic changes or age-related changes. Scattered susceptibility artifact   is also noted suggesting underlying amyloid angiopathy.      Labs:  Lab Results   Component Value Date    HGBA1C 5.20 10/06/2023      Lab Results   Component Value Date    CHOL 185 10/06/2023    TRIG 75 10/06/2023    HDL 74 (H) 10/06/2023    LDL 97 10/06/2023     LIVER FUNCTION TESTS:      Lab 10/06/23  0354 10/05/23  2328   TOTAL PROTEIN 7.3 7.1   ALBUMIN 4.0 3.9   GLOBULIN 3.3 3.2   ALT (SGPT) 10 9   AST (SGOT) 15 16   BILIRUBIN 0.2 0.2   ALK PHOS 90 86   LIPASE  --  50                 Assessment/Plan     Assessment:  Aretha Angela is an 83-year-old female with a past medical history of hypertension and aortic bioprosthetic valve replacement who presented to [facility] Emergency Department on 10/5/2023 with acute right upper extremity weakness.  Stroke neurology was consulted for further evaluation.  CT head 10/6/2023 no acute intracranial process.  CT angiogram head and neck 10/6/2023 no large vessel occlusion, stenosis, aneurysm, or vascular malformation.  MRI brain without contrast 10/6/2023 shows punctate right frontal lobe infarct.    # Transient right upper extremity weakness  Etiology concerning for transient ischemic attack and do not correlate with the patient's findings on MRI.    # Punctate right frontal lobe infarct  The etiology of the patient's infarct is possibly incidental,  however she does have an aortic bioprosthetic valve which could suggest cardioembolic etiology    -Speech therapy cleared the patient to swallow  -Continue aspirin 81 mg daily  -Continue atorvastatin 80 mg daily (LDL 97)  -Transthoracic echocardiogram final read pending  -Follow-up in stroke neurology clinic in 6 months after discharge (added to patient ADT summary)  -PT/OT recommendation: Inpatient rehabilitation facility  -Sequential compression devices in place for deep vein thrombosis prophylaxis     -Discussed with patient that unfortunately, even with the best medical management, repeat stroke risk will never be zero.    -Stroke education counseling provided: common manifestations of stroke include unilateral face, arm, or leg weakness, numbness, or paresthesias, unilateral facial droop, speech deficits (dysarthria, aphasia), acute unremitting dizziness with nausea, vomiting, nystagmus, and incoordination, visual deficits (amarosis fugax or hemianopsia), or severe onset headache    Discharge Planning: stroke neurology will follow results of transthoracic echocardiogram    Radha Prieto MD  [facility] [place]  10/07/23  12:04 EDT

## 2023-10-07 NOTE — PROGRESS NOTES
[facility] Medicine Services  PROGRESS NOTE    Patient Name: Aretha Angela  : 1940  MRN: [id number]    Date of Admission: 10/5/2023  Primary Care Physician: Kt Heredia MD    Subjective   Subjective     CC:  HTN urgency    HPI:  Feels improved but not ready to go home      Objective   Objective     Vital Signs:   Temp:  [97 øF (36.1 øC)-98.2 øF (36.8 øC)] 97.5 øF (36.4 øC)  Heart Rate:  [48-59] 52  Resp:  [16-18] 18  BP: (127-176)/() 176/75     Physical Exam:  Constitutional: No acute distress, awake, alert  HENT: NCAT, mucous membranes moist  Respiratory: Clear to auscultation bilaterally, respiratory effort normal   Cardiovascular: RRR, no murmurs, rubs, or gallops  Gastrointestinal: Positive bowel sounds, soft, nontender, nondistended  Musculoskeletal: No bilateral ankle edema  Psychiatric: Appropriate affect, cooperative  Neurologic: Oriented x 3, strength symmetric in all extremities, Cranial Nerves grossly intact to confrontation, speech clear  Skin: No rashes        Results Reviewed:  LAB RESULTS:      Lab 10/06/23  0354 10/05/23  2328   WBC 7.52 7.87   HEMOGLOBIN 13.1 12.3   HEMATOCRIT 40.6 38.7   PLATELETS 217 195   NEUTROS ABS 6.28 6.55   IMMATURE GRANS (ABS) 0.03 0.04   LYMPHS ABS 0.88 0.82   MONOS ABS 0.28 0.32   EOS ABS 0.01 0.09   MCV 91.4 91.5   LACTATE  --  1.3   PROTIME  --  13.1   APTT  --  28.9   D DIMER QUANT  --  0.91*         Lab 10/06/23  0354 10/06/23  0138 10/05/23  2328   SODIUM 141  --  139   POTASSIUM 3.8  --  4.0   CHLORIDE 104  --  106   CO2 27.0  --  25.0   ANION GAP 10.0  --  8.0   BUN 16  --  18   CREATININE 0.72  --  0.75   EGFR 83.1  --  79.1   GLUCOSE 133*  --  198*   CALCIUM 9.6  --  9.3   MAGNESIUM 2.3  --  2.2   HEMOGLOBIN A1C 5.20  --   --    TSH  --  0.856  --          Lab 10/06/23  0354 10/05/23  6395   TOTAL PROTEIN 7.3 7.1   ALBUMIN 4.0 3.9   GLOBULIN 3.3 3.2   ALT (SGPT) 10 9   AST (SGOT) 15 16   BILIRUBIN 0.2 0.2   ALK  PHOS 90 86   LIPASE  --  50         Lab 10/06/23  0354 10/06/23  0138 10/05/23  2328   PROBNP  --   --  438.0   HSTROP T 14* 15* 15*   PROTIME  --   --  13.1   INR  --   --  0.98         Lab 10/06/23  0354   CHOLESTEROL 185   LDL CHOL 97   HDL CHOL 74*   TRIGLYCERIDES 75             Brief Urine Lab Results  (Last result in the past 365 days)        Color   Clarity   Blood   Leuk Est   Nitrite   Protein   CREAT   Urine HCG        10/06/23 0008 Yellow   Clear   Negative   Negative   Negative   Negative                   Microbiology Results Abnormal       None            MRI Brain Without Contrast    Result Date: 10/6/2023  MRI BRAIN WO CONTRAST Date of Exam: 10/6/2023 7:05 PM EDT Indication: Stroke, follow up.  Comparison: None available. Technique:  Routine multiplanar/multisequence sequence images of the brain were obtained without contrast administration. FINDINGS: There is a small punctate focus of abnormal diffusion restriction within the periventricular white matter adjacent to the posterior horn of the right lateral ventricle within the posterior right frontal lobe. Increased T2 and FLAIR signal are noted. The findings suggest a small focus of acute to subacute ischemia. Advanced diffuse white matter signal changes are noted indicating chronic small vessel ischemic changes or age-related changes. Associated diffuse volume loss is noted. Scattered foci of susceptibility artifact are noted suggesting underlying amyloid angiopathy. There is no evidence for significant abnormal cerebral edema. There is no mass effect or midline shift. The ventricular system is nondilated. The basilar cisterns are patent. The midline structures are unremarkable. No significant extra-axial  fluid collections are identified. No significant abnormal signal is observed involving the paranasal sinuses or mastoid air cells.     Impression: 1.Evidence for a punctate focus of acute/subacute ischemic change within the periventricular white  matter adjacent to the posterior horn of the right lateral ventricle at the posterior aspect of the right frontal lobe. 2.Extensive diffuse nonspecific white matter signal changes are noted with associated diffuse volume loss. These findings are most consistent with extensive chronic small vessel ischemic changes or age-related changes. Scattered susceptibility artifact is also noted suggesting underlying amyloid angiopathy. Electronically Signed: Leandro Chávez MD  10/6/2023 7:41 PM EDT  Workstation ID: [id number]    CT Angiogram Head w AI Analysis of LVO    Result Date: 10/6/2023  CT ANGIOGRAM NECK, CT ANGIOGRAM HEAD W AI ANALYSIS OF LVO Date of Exam: 10/6/2023 2:01 AM EDT Indication: Neuro deficit, acute stroke suspected. Comparison: None available. Technique: CTA of the neck was performed before and after the uneventful intravenous administration of 115 mL Isovue-370. Reconstructed coronal and sagittal images were also obtained. In addition, a 3-D volume rendered image was created for interpretation. Automated exposure control and iterative reconstruction methods were used. Findings: Aortic arch: The aortic arch is unremarkable.  There is conventional 3 vessel arch anatomy.  The right brachiocephalic and visualized bilateral subclavian arteries are within normal limits. Right carotid: The right CCA arises as expected from the brachiocephalic trunk.  The CCA follows a normal course and appears normal caliber.  The carotid bifurcation is unremarkable.  The external carotid artery and distal branches appear within normal limits.  The cervical internal carotid artery follows a normal course and appears normal caliber throughout the neck and into the head.  The intracranial ICA segments appear within normal limits.  The ophthalmic artery origin is normal.  The A1 and M1 segments appear within normal limits.  The visualized distal RAMON and MCA branches appear patent.  There is  a patent  anterior communicating artery.  There is a patent  posterior communicating artery. Left carotid: The left CCA arises as expected from the aortic arch.   The CCA follows a normal course and appears normal caliber.  The carotid bifurcation is unremarkable.  The external carotid artery and distal branches appear within normal limits.  The  cervical internal carotid artery follows a normal course and appears normal caliber throughout the neck and into the head.  The intracranial ICA segments appear within normal limits.  The ophthalmic artery origin is normal.  The A1 and M1 segments appear within normal limits.  The visualized distal RAMON and MCA branches appear patent.  There is a patent  posterior communicating artery. Posterior circulation: Vertebral arteries arise as expected from ipsilateral subclavian arteries. The left vertebral artery is dominant. The vertebral arteries follow a normal course and appear normal caliber throughout the neck and into the head.  The V4 segments are patent.  Visualized posterior inferior cerebellar arteries are within normal limits.  The basilar artery is normal caliber.  Superior cerebellar arteries are patent.  Bilateral P1 segments and posterior cerebral arteries appear within normal limits. The prominence of the basilar artery suggested on CT is not felt to represent aneurysm. Nonvascular findings: There is no acute intracranial abnormality. There is atrophy and chronic microvascular ischemic change. Orbits are within normal limits.  Paranasal sinuses and mastoid air cells appear well aerated.  Superficial soft tissues and underlying musculature appear within normal limits.  The lung apices are clear.  The thyroid and salivary glands appear unremarkable.  The suprahyoid and infrahyoid spaces of the neck appear unremarkable.  There is no evidence of cervical lymphadenopathy  or a neck mass.  There are no acute osseous abnormalities or destructive bone lesions.     Impression: Impression: No acute large vessel  occlusion, stenosis, aneurysm or vascular malformation. Electronically Signed: Ricco Wisdom MD  10/6/2023 2:32 AM EDT  Workstation ID: [id number]    CT Angiogram Neck    Result Date: 10/6/2023  CT ANGIOGRAM NECK, CT ANGIOGRAM HEAD W AI ANALYSIS OF LVO Date of Exam: 10/6/2023 2:01 AM EDT Indication: Neuro deficit, acute stroke suspected. Comparison: None available. Technique: CTA of the neck was performed before and after the uneventful intravenous administration of 115 mL Isovue-370. Reconstructed coronal and sagittal images were also obtained. In addition, a 3-D volume rendered image was created for interpretation. Automated exposure control and iterative reconstruction methods were used. Findings: Aortic arch: The aortic arch is unremarkable.  There is conventional 3 vessel arch anatomy.  The right brachiocephalic and visualized bilateral subclavian arteries are within normal limits. Right carotid: The right CCA arises as expected from the brachiocephalic trunk.  The CCA follows a normal course and appears normal caliber.  The carotid bifurcation is unremarkable.  The external carotid artery and distal branches appear within normal limits.  The cervical internal carotid artery follows a normal course and appears normal caliber throughout the neck and into the head.  The intracranial ICA segments appear within normal limits.  The ophthalmic artery origin is normal.  The A1 and M1 segments appear within normal limits.  The visualized distal RAMON and MCA branches appear patent.  There is  a patent  anterior communicating artery. There is a patent  posterior communicating artery. Left carotid: The left CCA arises as expected from the aortic arch.   The CCA follows a normal course and appears normal caliber.  The carotid bifurcation is unremarkable.  The external carotid artery and distal branches appear within normal limits.  The  cervical internal carotid artery follows a normal course and appears normal caliber  throughout the neck and into the head.  The intracranial ICA segments appear within normal limits.  The ophthalmic artery origin is normal.  The A1 and M1 segments appear within normal limits.  The visualized distal RAMON and MCA branches appear patent.  There is a patent  posterior communicating artery. Posterior circulation: Vertebral arteries arise as expected from ipsilateral subclavian arteries. The left vertebral artery is dominant. The vertebral arteries follow a normal course and appear normal caliber throughout the neck and into the head.  The V4 segments are patent.  Visualized posterior inferior cerebellar arteries are within normal limits.  The basilar artery is normal caliber.  Superior cerebellar arteries are patent.  Bilateral P1 segments and posterior cerebral arteries appear within normal limits. The prominence of the basilar artery suggested on CT is not felt to represent aneurysm. Nonvascular findings: There is no acute intracranial abnormality. There is atrophy and chronic microvascular ischemic change. Orbits are within normal limits.  Paranasal sinuses and mastoid air cells appear well aerated.  Superficial soft tissues and underlying musculature appear within normal limits.  The lung apices are clear.  The thyroid and salivary glands appear unremarkable.  The suprahyoid and infrahyoid spaces of the neck appear unremarkable.  There is no evidence of cervical lymphadenopathy  or a neck mass.  There are no acute osseous abnormalities or destructive bone lesions.     Impression: Impression: No acute large vessel occlusion, stenosis, aneurysm or vascular malformation. Electronically Signed: Ricco Wisdom MD  10/6/2023 2:32 AM EDT  Workstation ID: [id number]    CT CEREBRAL PERFUSION WITH & WITHOUT CONTRAST    Result Date: 10/6/2023  CT CEREBRAL PERFUSION W WO CONTRAST Date of Exam: 10/6/2023 2:01 AM EDT Indication: Neuro deficit, acute stroke suspected.  Comparison: None available. Technique: Axial CT  images of the brain were obtained prior to and after the administration of 115 mL Isovue-370. Core blood volume, core blood flow, mean transit time, and Tmax images were obtained utilizing the Rapid software protocol. A limited CT angiogram of the head was also performed to measure the blood vessel density. The radiation dose reduction device was turned on for each scan per the ALARA (As Low as Reasonably Achievable) protocol. Findings: Total hypoperfusion: Using a Tmax threshold of greater than 6 seconds, there is no evidence of hypoperfusion. Core infarct: Using a threshold of cerebral blood flow less than 30%, there is no evidence of core infarct. Ischemic penumbra: There is no evidence of ischemic penumbra     Impression: Impression: Normal CT perfusion Electronically Signed: Ricco Wisdom MD  10/6/2023 2:26 AM EDT  Workstation ID: [id number]    CT Head Without Contrast Stroke Protocol    Result Date: 10/6/2023  CT HEAD WO CONTRAST STROKE PROTOCOL Date of Exam: 10/6/2023 1:56 AM EDT Indication: Neuro deficit, acute, stroke suspected Neuro deficit, acute stroke suspected. Comparison: None available. Technique: Axial CT images were obtained of the head without contrast administration.  Reconstructed coronal images were also obtained. Automated exposure control and iterative construction methods were used. Scan Time: October 6, 2023 at 0157 hours Results discussed with the stroke team representative, Ben at 0205 hours. Findings: Contrast is seen within the vascular system from earlier exam. The basilar artery is prominent. Please refer to CT angiogram findings. There is mild diffuse generalized atrophy. There are low-attenuation areas in the periventricular white matter consistent with chronic microvascular ischemic change. There is no mass, mass effect or midline shift. There are no abnormal extra-axial fluid collections or areas of acute hemorrhage. The paranasal sinuses are clear. The mastoid air cells are clear.      Impression: Impression: Mild atrophy and chronic microvascular ischemia. No acute intracranial process. Electronically Signed: Ricco Wisdom MD  10/6/2023 2:06 AM EDT  Workstation ID: [id number]    CT Angiogram Chest    Result Date: 10/5/2023  CT ANGIOGRAM CHEST Date of Exam: 10/5/2023 10:46 PM EDT Indication: Chest pain, nonspecific. Comparison: None available. Technique: CTA of the chest was performed after the uneventful intravenous administration of 75 cc Isovue 370. Reconstructed coronal and sagittal images were also obtained. In addition, a 3-D volume rendered image was created for interpretation. Automated exposure control and iterative reconstruction methods were used. Findings: The thoracic inlet is unremarkable. There are no enlarged axillary lymph nodes . There is no vascular filling defects to suggest pulmonary emboli. There is no acute infiltrate. There is no pleural effusion. There is mild cephalization of the pulmonary vessels with smooth septal thickening suggestive of a mild pulmonary vascular congestion. The heart is enlarged. There are calcifications of the aortic valve. There are no coronary artery calcifications. There is a simple cyst in the right kidney. There is a moderate-sized hiatal hernia containing a small portion of the stomach.     Impression: Impression: No vascular filling defect to suggest pulmonary emboli. Findings most suggestive of mild pulmonary vascular congestion. Cardiomegaly. Hiatal hernia. Electronically Signed: Gio Kevin MD  10/5/2023 11:02 PM EDT  Workstation ID: [id number]    CT Abdomen Pelvis Without Contrast    Result Date: 10/5/2023  CT ABDOMEN PELVIS WO CONTRAST Date of Exam: 10/5/2023 10:39 PM EDT Indication: Nausea/vomiting. Comparison: None available. Technique: Axial CT images were obtained of the abdomen and pelvis without the administration of contrast. Reconstructed coronal and sagittal images were also obtained. Automated exposure control and iterative  construction methods were used. FINDINGS: The lung bases are clear without evidence for focal consolidation or significant pleural effusion. A small hiatal hernia is noted. Associated reflux is observed. The liver, spleen, and pancreas are unremarkable without contrast. The gallbladder is decompressed. No biliary dilatation is seen. The bilateral adrenal glands are symmetric and unremarkable without contrast. No definitive nephrolithiasis is seen bilaterally. There is no hydronephrosis or hydroureter. There is no evidence for obstructive uropathy. No stones are seen in the bladder. Bilateral renal cysts are noted. There is no bowel dilatation or obstruction. There is no evidence for acute appendicitis. There is a fluid density focus within the right abdomen adjacent to the proximal ascending colon. This finding may be related to a mesenchymal cyst or enteric duplication cyst. This finding measures approximately 5 cm. No significant free fluid is observed. No significant lymphadenopathy is seen throughout the abdomen or pelvis. The bladder is partially decompressed. No acute osseous abnormalities are observed.     Impression: 1.No evidence for significant nephrolithiasis. There is no evidence for obstructive uropathy. 2.No evidence for acute abnormality throughout the abdomen or pelvis on this noncontrast exam. 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 4.There is a small hiatal hernia. There is associated reflux. Electronically Signed: Leandro Chávez MD  10/5/2023 10:56 PM EDT  Workstation ID: [id number]     Results for orders placed during the hospital encounter of 06/30/22    Adult Transthoracic Echo Complete W/ Cont if Necessary Per Protocol    Interpretation Summary  ú Calculated left ventricular EF = 62.6%  ú Left ventricular wall thickness is consistent with moderate to severe septal asymmetric  hypertrophy. Sigmoid-shaped ventricular septum is present  ú No LVOT obstruction.  ú Mild aortic valve stenosis is present. Mean gradient 23 mmHg dimensionless index 0.30. Relatively stable findings compared with 2020 echo  ú There is mild paravalvular aortic regurgitation noted. Pressure half-time 633 ms.      Current medications:  Scheduled Meds:aspirin, 81 mg, Oral, Daily  atorvastatin, 80 mg, Oral, Nightly  metoprolol succinate XL, 50 mg, Oral, Daily  sodium chloride, 10 mL, Intravenous, Q12H      Continuous Infusions:   PRN Meds:.  acetaminophen    Calcium Replacement - Follow Nurse / BPA Driven Protocol    HYDROcodone-acetaminophen    influenza vaccine    Magnesium Standard Dose Replacement - Follow Nurse / BPA Driven Protocol    nitroglycerin    ondansetron    Phosphorus Replacement - Follow Nurse / BPA Driven Protocol    Potassium Replacement - Follow Nurse / BPA Driven Protocol    sodium chloride    sodium chloride    Assessment & Plan   Assessment & Plan     Active Hospital Problems    Diagnosis  POA    **Hypertensive urgency [I16.0]  Yes      Resolved Hospital Problems   No resolved problems to display.        Brief Hospital Course to date:  Aretha Angela is a 83 y.o. female admitted with severe upper back pain after eating red beans and rice at a restaurant.  Before she left the restaurant she had severe pain.  She had difficulty ambulating.  The symptoms were associated with nausea and diarrhea.  She presented to the emergency department for evaluation.  She complained of difficulty with her arms complaining of weakness and discomfort.  For this reason stroke was considered and CT a imaging of her head and neck was performed.  This was essentially normal.  Her symptoms have now almost completely resolved and her pain is a 2 out of 10.  Given the nature of her pain troponins were also checked which were flat.  Lipase was normal.  Her blood pressure was significantly elevated with systolic up to 221 and  diastolic up to 137.  Her symptoms improved after being given morphine.  Subsequently she was started on a Cardene drip however blood pressure normalized and she became hypotensive.  Cardene drip was then stopped.  Of note her son is at bedside and states he googled her symptoms and thought she was having a spinal stroke.    HTN Urgency  TIA  Abnormal Brain MRI  CVA (acute vs subacute)   -- MRI with front infarct (acute vs subacute)  -- other workup negative thus far  -- stroke neuro has seen and does not feel symptoms correlate to MRI findings  -- continue ASA, HD statin, 6 month f/u with stroke neuro clinic   -- PT recommending inpatient rehab, patient reluctant    Neck/back/shoulder pain  - May be secondary to uncontrolled hypertension.  - resolved     History of aortic bioprosthetic valve placed in 2010, due to severe aortic stenosis  - She states she is followed for this as an outpatient by cardiologist here.  - Continue telemetry.      Expected Discharge Location and Transportation: home vs rehab  Expected Discharge   Expected Discharge Date: 10/8/2023; Expected Discharge Time:      DVT prophylaxis:  Mechanical DVT prophylaxis orders are present.     AM-PAC 6 Clicks Score (PT): 18 (10/07/23 1011)    CODE STATUS:   Code Status and Medical Interventions:   Ordered at: 10/06/23 0230     Level Of Support Discussed With:    Patient     Code Status (Patient has no pulse and is not breathing):    CPR (Attempt to Resuscitate)     Medical Interventions (Patient has pulse or is breathing):    Full Support       Muriel Pedroza, SAFIA  10/07/23

## 2023-10-07 NOTE — THERAPY EVALUATION
Patient Name: Aretha Angela  : 1940    MRN: [id number]                              Today's Date: 10/7/2023       Admit Date: 10/5/2023    Visit Dx:     ICD-10-CM ICD-9-CM   1. Weakness of right upper extremity  R29.898 729.89   2. Hypertensive urgency  I16.0 401.9   3. Acute midline thoracic back pain  M54.6 724.1   4. Atypical chest pain  R07.89 786.59   5. Diarrhea, unspecified type  R19.7 787.91   6. Incontinence of feces, unspecified fecal incontinence type  R15.9 787.60   7. Elevated d-dimer  R79.89 790.92     Patient Active Problem List   Diagnosis    S/P aortic valve replacement with bioprosthetic valve    Hypertensive urgency     Past Medical History:   Diagnosis Date    Chronic gastric ulcer     Dysphagia     GERD (gastroesophageal reflux disease)     Hypertension     Nausea      Past Surgical History:   Procedure Laterality Date    AORTIC VALVE REPAIR/REPLACEMENT      HYSTERECTOMY      KNEE ARTHROSCOPY        General Information       Row Name 10/07/23 0943          Physical Therapy Time and Intention    Document Type evaluation  -CD     Mode of Treatment physical therapy  -CD       Row Name 10/07/23 0943          General Information    Patient Profile Reviewed yes  -CD     Prior Level of Function independent:;all household mobility;community mobility;transfer;gait;bed mobility;ADL's;max assist:;home management;cleaning  PT REPORTS SHE GOES TO THE [facility] 2 DAYS PER WEEK TO EXERCISE.  -CD     Existing Precautions/Restrictions fall  PT DENIES RECENT FALLS BUT DEMONSTRATES UNSTEADY GAIT AND HAS POOR SELF AWARENESS/ INSIGHT  INTO DEFICITS.  -CD     Barriers to Rehab previous functional deficit;cognitive status  -CD       Row Name 10/07/23 0943          Living Environment    People in Home alone  -CD       Row Name 10/07/23 0943          Home Main Entrance    Number of Stairs, Main Entrance six  -CD     Stair Railings, Main Entrance none  -CD       Row Name 10/07/23 0943          Stairs Within Home,  Primary    Stairs, Within Home, Primary 15 STEPS WITH 1 RAIL  -CD     Stair Railings, Within Home, Primary railings safe and in good condition  -CD       Row Name 10/07/23 0943          Cognition    Orientation Status (Cognition) oriented x 4   PT CONFUSED IN CONVERSATION. NOTED PERSONAL CLOTHES AND SHOES DIRTY AND COVERED IN HAIR. PT REPORTS THE DOGS HAVE CHEWED UP ALL HER SHOES.  PT ASKING IF SHE COULD TAKE MULTIPLE ITEMS HOME WITH HER, KLEENEX BOXES, SLIPPER SOCKS, ETC. NSG AND MD NOTIFIED.  -CD       Row Name 10/07/23 0943          Safety Issues, Functional Mobility    Safety Issues Affecting Function (Mobility) awareness of need for assistance;impulsivity;insight into deficits/self-awareness  -CD     Impairments Affecting Function (Mobility) balance;cognition;endurance/activity tolerance;pain;strength  -CD     Cognitive Impairments, Mobility Safety/Performance awareness, need for assistance;impulsivity;insight into deficits/self-awareness;safety precaution awareness;safety precaution follow-through;sequencing abilities;problem-solving/reasoning;judgment  -CD               User Key  (r) = Recorded By, (t) = Taken By, (c) = Cosigned By      Initials Name Provider Type    CD Haven Katz, PT Physical Therapist                   Mobility       Row Name 10/07/23 0950          Bed Mobility    Supine-Sit [place] (Bed Mobility) contact guard  -CD     Assistive Device (Bed Mobility) bed rails;head of bed elevated  -CD     Comment, (Bed Mobility) INCREASED TIME AND EFFORT.  -CD       Row Name 10/07/23 0950          Transfers    Comment, (Transfers) CUES FOR HAND PLACEMENT.  STS WITH WIDE GEMMA AND UNSTEADY, REACHING FOR EXTERNAL SUPPORT.  -CD       Row Name 10/07/23 0950          Sit-Stand Transfer    Sit-Stand [place] (Transfers) contact guard;verbal cues  -CD       Row Name 10/07/23 0950          Gait/Stairs (Locomotion)    [place] Level (Gait) verbal cues;minimum assist (75% patient effort)  -CD      Assistive Device (Gait) --  MIN ASSIST WITHOUT A.D. CGA WITH R WALKER  -CD     Distance in Feet (Gait) 250 FEET  -CD     Deviations/Abnormal Patterns (Gait) stride length decreased;base of support, wide;scissoring  -CD     Bilateral Gait Deviations forward flexed posture;heel strike decreased  -CD     Comment, (Gait/Stairs) INTERMITTENT SCISSORING, STAGGER STEPS BUT NO OVERT LOB. REMAINS PARTIALLY FLEXED AT HIPS/KNEES. IMPROVED BALANCE WITH USE OF R WALKER FOR SUPPORT.  -CD               User Key  (r) = Recorded By, (t) = Taken By, (c) = Cosigned By      Initials Name Provider Type    CD Haven Katz, PT Physical Therapist                   Obj/Interventions       Row Name 10/07/23 0955          Range of Motion Comprehensive    General Range of Motion bilateral lower extremity ROM WFL  -CD       Row Name 10/07/23 0955          Strength Comprehensive (MMT)    General Manual Muscle Testing (MMT) Assessment lower extremity strength deficits identified  -CD     Comment, General Manual Muscle Testing (MMT) Assessment B HIP FLEX 3+/5, KNEE EXT 4/5, DF 4/5.  -CD       Row Name 10/07/23 0955          Motor Skills    Motor Skills functional endurance  -CD     Functional Endurance O2 SATS STABLE ON RA.  -CD       Row Name 10/07/23 0955          Balance    Balance Assessment sitting dynamic balance;sit to stand dynamic balance;sitting static balance;standing static balance  -CD     Static Sitting Balance independent  -CD     Dynamic Sitting Balance standby assist  -CD     Position, Sitting Balance unsupported;sitting edge of bed  -CD     Sit to Stand Dynamic Balance contact guard  -CD     Static Standing Balance contact guard  -CD     Dynamic Standing Balance minimal assist  -CD     Position/Device Used, Standing Balance supported;walker, front-wheeled  AND R UE SUPPORT.  -CD     Balance Interventions standing;sit to stand;supported;sitting;static;dynamic  -CD     Comment, Balance NO OVERT LOB BUT UNSTEADY WITH POOR SAFETY  AWARENESS.  -CD       Row Name 10/07/23 0955          Sensory Assessment (Somatosensory)    Sensory Assessment (Somatosensory) LE sensation intact  -CD               User Key  (r) = Recorded By, (t) = Taken By, (c) = Cosigned By      Initials Name Provider Type    CD Haven Katz, PT Physical Therapist                   Goals/Plan       Row Name 10/07/23 1009          Bed Mobility Goal 1 (PT)    Activity/Assistive Device (Bed Mobility Goal 1, PT) sit to supine/supine to sit  -CD     [place] Level/Cues Needed (Bed Mobility Goal 1, PT) independent  -CD     Time Frame (Bed Mobility Goal 1, PT) long term goal (LTG);2 weeks  -CD       Row Name 10/07/23 1009          Transfer Goal 1 (PT)    Activity/Assistive Device (Transfer Goal 1, PT) sit-to-stand/stand-to-sit;bed-to-chair/chair-to-bed  -CD     [place] Level/Cues Needed (Transfer Goal 1, PT) independent  -CD     Time Frame (Transfer Goal 1, PT) long term goal (LTG);2 weeks  -       Row Name 10/07/23 1009          Gait Training Goal 1 (PT)    Activity/Assistive Device (Gait Training Goal 1, PT) gait (walking locomotion);assistive device use  with least restrictive device  -CD     [place] Level (Gait Training Goal 1, PT) independent  -CD     Distance (Gait Training Goal 1, PT) 600 feet  -CD     Time Frame (Gait Training Goal 1, PT) long term goal (LTG);2 weeks  -       Row Name 10/07/23 1009          Therapy Assessment/Plan (PT)    Planned Therapy Interventions (PT) balance training;bed mobility training;gait training;home exercise program;postural re-education;transfer training;strengthening  -CD               User Key  (r) = Recorded By, (t) = Taken By, (c) = Cosigned By      Initials Name Provider Type    CD Haven Katz PT Physical Therapist                   Clinical Impression       Row Name 10/07/23 0958          Pain    Pretreatment Pain Rating 3/10  -CD     Posttreatment Pain Rating 3/10  -CD     Pain Location - Side/Orientation Right   -CD     Pain Location - shoulder  -CD     Pain Intervention(s) Repositioned;Cold pack  -CD       Row Name 10/07/23 0958          Plan of Care Review    Plan of Care Reviewed With patient  -CD     Outcome Evaluation PT PRESENTS WITH EVOLVING SYMPTOMS TO INCLUDE IMPAIRED BALANCE, GENERALIZED WEAKNESS, DECREASED SAFETY AWARENESS, CONVERSATIONAL CONFUSION AND C/O R SHOULDER PAIN. GIVEN CONDITION OF PERSONAL CLOTHES AND SHOES AND PT REPORTS OF DOGS CHEWING UP ALL HER SHOES, HAVE CONCERNS RE: HOME ENVIRONMENT.  RELAYED CONCERNS TO NSG AND MD. PT COMPLETED TRANSFERS WITH CGA AND GAIT WITH CGA/MIN ASSIST X 250 FEET. PT DEMONSRATES UNSTEADY GAIT WITH WIDE GEMMA AND FLEXED POSTURE AND HAS POOR INSIGHT INTO DEFICITS. RECOMMENT IRF AT D/C AS PT LIVES ALONE AND MUST NAVIGATE STAIRS.  -CD       Row Name 10/07/23 0958          Therapy Assessment/Plan (PT)    Patient/Family Therapy Goals Statement (PT) to go home.  -CD     Rehab Potential (PT) good, to achieve stated therapy goals  -CD     Criteria for Skilled Interventions Met (PT) yes;meets criteria;skilled treatment is necessary  -CD     Therapy Frequency (PT) daily  -CD     Predicted Duration of Therapy Intervention (PT) 2 weeks  -CD       Row Name 10/07/23 0958          Vital Signs    Pre Systolic BP Rehab 176  -CD     Pre Treatment Diastolic BP 75  -CD     Post Systolic BP Rehab 177  -CD     Post Treatment Diastolic BP 72  -CD     Pretreatment Heart Rate (beats/min) 49  -CD     Posttreatment Heart Rate (beats/min) 48  -CD     Pre SpO2 (%) 97  -CD     Post SpO2 (%) 96  -CD     O2 Delivery Post Treatment room air  -CD     Pre Patient Position Supine  -CD     Intra Patient Position Standing  -CD     Post Patient Position Sitting  -CD       Row Name 10/07/23 0958          Positioning and Restraints    Pre-Treatment Position in bed  -CD     Post Treatment Position chair  -CD     In Chair notified nsg;reclined;call light within reach;encouraged to call for assist;exit alarm on;with  other staff;legs elevated;waffle cushion  -CD               User Key  (r) = Recorded By, (t) = Taken By, (c) = Cosigned By      Initials Name Provider Type    Haven Taylor PT Physical Therapist                   Outcome Measures       Row Name 10/07/23 1011          How much help from another person do you currently need...    Turning from your back to your side while in flat bed without using bedrails? 3  -CD     Moving from lying on back to sitting on the side of a flat bed without bedrails? 3  -CD     Moving to and from a bed to a chair (including a wheelchair)? 3  -CD     Standing up from a chair using your arms (e.g., wheelchair, bedside chair)? 3  -CD     Climbing 3-5 steps with a railing? 3  -CD     To walk in hospital room? 3  -CD     AM-PAC 6 Clicks Score (PT) 18  -CD     Highest level of mobility 6 --> Walked 10 steps or more  -CD       Row Name 10/07/23 1011          Functional Assessment    Outcome Measure Options AM-PAC 6 Clicks Basic Mobility (PT)  -CD               User Key  (r) = Recorded By, (t) = Taken By, (c) = Cosigned By      Initials Name Provider Type    CD Haven Katz PT Physical Therapist                                 Physical Therapy Education       Title: PT OT SLP Therapies (In Progress)       Topic: Physical Therapy (Done)       Point: Mobility training (Done)       Learning Progress Summary             Patient Acceptance, E, VU,NR by CD at 10/7/2023 1012    Comment: BENEFITS OF OOB ACTIVITY, SAFETY WITH MOBILITY, PROGRESSION OF POC, D/C PLANNING,                         Point: Home exercise program (Done)       Learning Progress Summary             Patient Acceptance, E, VU,NR by CD at 10/7/2023 1012    Comment: BENEFITS OF OOB ACTIVITY, SAFETY WITH MOBILITY, PROGRESSION OF POC, D/C PLANNING,                         Point: Body mechanics (Done)       Learning Progress Summary             Patient Acceptance, E, VU,NR by CD at 10/7/2023 1012    Comment: BENEFITS OF OOB  ACTIVITY, SAFETY WITH MOBILITY, PROGRESSION OF POC, D/C PLANNING,                         Point: Precautions (Done)       Learning Progress Summary             Patient Acceptance, E, VU,NR by CD at 10/7/2023 1012    Comment: BENEFITS OF OOB ACTIVITY, SAFETY WITH MOBILITY, PROGRESSION OF POC, D/C PLANNING,                                         User Key       Initials Effective Dates Name Provider Type Discipline    CD 02/03/23 -  Haven Katz, PT Physical Therapist PT                  PT Recommendation and Plan  Planned Therapy Interventions (PT): balance training, bed mobility training, gait training, home exercise program, postural re-education, transfer training, strengthening  Plan of Care Reviewed With: patient  Outcome Evaluation: PT PRESENTS WITH EVOLVING SYMPTOMS TO INCLUDE IMPAIRED BALANCE, GENERALIZED WEAKNESS, DECREASED SAFETY AWARENESS, CONVERSATIONAL CONFUSION AND C/O R SHOULDER PAIN. GIVEN CONDITION OF PERSONAL CLOTHES AND SHOES AND PT REPORTS OF DOGS CHEWING UP ALL HER SHOES, HAVE CONCERNS RE: HOME ENVIRONMENT.  RELAYED CONCERNS TO NSG AND MD. PT COMPLETED TRANSFERS WITH CGA AND GAIT WITH CGA/MIN ASSIST X 250 FEET. PT DEMONSRATES UNSTEADY GAIT WITH WIDE GEMMA AND FLEXED POSTURE AND HAS POOR INSIGHT INTO DEFICITS. RECOMMENT IRF AT D/C AS PT LIVES ALONE AND MUST NAVIGATE STAIRS.     Time Calculation:   PT Evaluation Complexity  History, PT Evaluation Complexity: 3 or more personal factors and/or comorbidities  Examination of Body Systems (PT Eval Complexity): total of 4 or more elements  Clinical Presentation (PT Evaluation Complexity): evolving  Clinical Decision Making (PT Evaluation Complexity): moderate complexity  Overall Complexity (PT Evaluation Complexity): moderate complexity     PT Charges       Row Name 10/07/23 1013             Time Calculation    Start Time 0908  -CD      PT Received On 10/07/23  -CD      PT Goal Re-Cert Due Date 10/17/23  -CD         Time Calculation- PT    Total Timed  Code Minutes- PT 10 minute(s)  -CD         Timed Charges    36587 - Gait Training Minutes  10  -CD         Untimed Charges    PT Eval/Re-eval Minutes 60  -CD         Total Minutes    Timed Charges Total Minutes 10  -CD      Untimed Charges Total Minutes 60  -CD       Total Minutes 70  -CD                User Key  (r) = Recorded By, (t) = Taken By, (c) = Cosigned By      Initials Name Provider Type    CD Haven Katz, PT Physical Therapist                  Therapy Charges for Today       Code Description Service Date Service Provider Modifiers Qty    44880299674 HC GAIT TRAINING EA 15 MIN 10/7/2023 Haven Katz, PT GP 1    52881435453 HC PT EVAL MOD COMPLEXITY 4 10/7/2023 Haven Katz, PT GP 1            PT G-Codes  Outcome Measure Options: AM-PAC 6 Clicks Basic Mobility (PT)  AM-PAC 6 Clicks Score (PT): 18  AM-PAC 6 Clicks Score (OT): 19  PT Discharge Summary  Anticipated Discharge Disposition (PT): inpatient rehabilitation facility    Haven Katz, PT  10/7/2023

## 2023-10-08 PROBLEM — I63.9 CVA (CEREBRAL VASCULAR ACCIDENT): Status: ACTIVE | Noted: 2023-10-08

## 2023-10-08 LAB
BH CV ECHO MEAS - AI P1/2T: 522.1 MSEC
BH CV ECHO MEAS - AO MAX PG: 42.8 MMHG
BH CV ECHO MEAS - AO MEAN PG: 24.3 MMHG
BH CV ECHO MEAS - AO ROOT DIAM: 2.29 CM
BH CV ECHO MEAS - AO V2 MAX: 327 CM/SEC
BH CV ECHO MEAS - AO V2 VTI: 77.5 CM
BH CV ECHO MEAS - AVA(I,D): 1.12 CM2
BH CV ECHO MEAS - EDV(CUBED): 17.7 ML
BH CV ECHO MEAS - EDV(MOD-SP2): 95.2 ML
BH CV ECHO MEAS - EDV(MOD-SP4): 85.1 ML
BH CV ECHO MEAS - EF(MOD-BP): 67 %
BH CV ECHO MEAS - EF(MOD-SP2): 68.9 %
BH CV ECHO MEAS - EF(MOD-SP4): 62.2 %
BH CV ECHO MEAS - ESV(CUBED): 7.9 ML
BH CV ECHO MEAS - ESV(MOD-SP2): 29.6 ML
BH CV ECHO MEAS - ESV(MOD-SP4): 32.1 ML
BH CV ECHO MEAS - FS: 23.7 %
BH CV ECHO MEAS - IVS/LVPW: 1.29 CM
BH CV ECHO MEAS - IVSD: 1.84 CM
BH CV ECHO MEAS - LA DIMENSION: 3.6 CM
BH CV ECHO MEAS - LV DIASTOLIC VOL/BSA (35-75): 52.2 CM2
BH CV ECHO MEAS - LV MASS(C)D: 155.2 GRAMS
BH CV ECHO MEAS - LV MAX PG: 6.5 MMHG
BH CV ECHO MEAS - LV MEAN PG: 4.4 MMHG
BH CV ECHO MEAS - LV SYSTOLIC VOL/BSA (12-30): 19.7 CM2
BH CV ECHO MEAS - LV V1 MAX: 127.8 CM/SEC
BH CV ECHO MEAS - LV V1 VTI: 34.2 CM
BH CV ECHO MEAS - LVIDD: 2.6 CM
BH CV ECHO MEAS - LVIDS: 1.99 CM
BH CV ECHO MEAS - LVOT AREA: 2.5 CM2
BH CV ECHO MEAS - LVOT DIAM: 1.8 CM
BH CV ECHO MEAS - LVPWD: 1.43 CM
BH CV ECHO MEAS - MV MAX PG: 5 MMHG
BH CV ECHO MEAS - MV MEAN PG: 1.6 MMHG
BH CV ECHO MEAS - MV P1/2T: 90 MSEC
BH CV ECHO MEAS - MV V2 VTI: 31.3 CM
BH CV ECHO MEAS - MVA(P1/2T): 2.4 CM2
BH CV ECHO MEAS - MVA(VTI): 2.8 CM2
BH CV ECHO MEAS - RAP SYSTOLE: 3 MMHG
BH CV ECHO MEAS - SI(MOD-SP2): 40.3 ML/M2
BH CV ECHO MEAS - SI(MOD-SP4): 32.5 ML/M2
BH CV ECHO MEAS - SV(LVOT): 86.9 ML
BH CV ECHO MEAS - SV(MOD-SP2): 65.6 ML
BH CV ECHO MEAS - SV(MOD-SP4): 52.9 ML
BH CV VAS BP RIGHT ARM: NORMAL MMHG

## 2023-10-08 PROCEDURE — 25010000002 HYDRALAZINE PER 20 MG

## 2023-10-08 PROCEDURE — 25010000002 ONDANSETRON PER 1 MG: Performed by: INTERNAL MEDICINE

## 2023-10-08 PROCEDURE — 99232 SBSQ HOSP IP/OBS MODERATE 35: CPT | Performed by: STUDENT IN AN ORGANIZED HEALTH CARE EDUCATION/TRAINING PROGRAM

## 2023-10-08 RX ORDER — HYDRALAZINE HYDROCHLORIDE 20 MG/ML
10 INJECTION INTRAMUSCULAR; INTRAVENOUS EVERY 6 HOURS PRN
Status: DISCONTINUED | OUTPATIENT
Start: 2023-10-08 | End: 2023-10-10 | Stop reason: HOSPADM

## 2023-10-08 RX ADMIN — METOPROLOL SUCCINATE 50 MG: 50 TABLET, EXTENDED RELEASE ORAL at 08:13

## 2023-10-08 RX ADMIN — ONDANSETRON 4 MG: 2 INJECTION INTRAMUSCULAR; INTRAVENOUS at 04:21

## 2023-10-08 RX ADMIN — Medication 10 ML: at 08:13

## 2023-10-08 RX ADMIN — ACETAMINOPHEN 650 MG: 325 TABLET ORAL at 08:13

## 2023-10-08 RX ADMIN — ASPIRIN 81 MG: 81 TABLET, COATED ORAL at 08:13

## 2023-10-08 RX ADMIN — ATORVASTATIN CALCIUM 80 MG: 40 TABLET, FILM COATED ORAL at 20:49

## 2023-10-08 RX ADMIN — HYDRALAZINE HYDROCHLORIDE 10 MG: 20 INJECTION INTRAMUSCULAR; INTRAVENOUS at 00:25

## 2023-10-08 RX ADMIN — HYDROCODONE BITARTRATE AND ACETAMINOPHEN 1 TABLET: 5; 325 TABLET ORAL at 04:53

## 2023-10-08 NOTE — PROGRESS NOTES
[facility] Medicine Services  PROGRESS NOTE    Patient Name: Aretha Angela  : 1940  MRN: [id number]    Date of Admission: 10/5/2023  Primary Care Physician: Kt Heredia MD    Subjective   Subjective     CC:  CVA    HPI:  Patient is an 83-year-old who presented to the emergency department with upper back pain and was admitted with hypertensive urgency.  There was also concern for right-sided weakness and stroke work-up was initiated.  CT imaging was reassuring however MRI shows acute versus subacute frontal lobe lesion.  Neurology was consulted and felt her symptom presentation did not correlate with MRI findings.  The patient's son does report she has been having problems with memory and confusion and inappropriate conversations for some time at least a month.  She had seen her PCP Dr. Heredia who had referred her to the [facility].  She has not seen them yet.  Patient reports she feels much better overall and would like to be discharged soon.  Physical therapy has recommended secondary to unsteady gait, decreased awareness of safety she could benefit from rehab.      Objective   Objective     Vital Signs:   Temp:  [97.2 øF (36.2 øC)-98.5 øF (36.9 øC)] 98 øF (36.7 øC)  Heart Rate:  [52-63] 54  Resp:  [18] 18  BP: (135-213)/(61-96) 170/83     Physical Exam:  Constitutional: No acute distress, awake, alert  HENT: NCAT, mucous membranes moist  Respiratory: Clear to auscultation bilaterally, respiratory effort normal   Cardiovascular: RRR, positive murmur  Gastrointestinal: Positive bowel sounds, soft, nontender, nondistended  Musculoskeletal: No bilateral ankle edema  Psychiatric: Appropriate affect, cooperative  Neurologic: Oriented x 3, strength symmetric in all extremities, Cranial Nerves grossly intact to confrontation, speech clear  Skin: No rashes        Results Reviewed:  LAB RESULTS:      Lab 10/06/23  0354 10/05/23  6018   WBC 7.52 7.87    HEMOGLOBIN 13.1 12.3   HEMATOCRIT 40.6 38.7   PLATELETS 217 195   NEUTROS ABS 6.28 6.55   IMMATURE GRANS (ABS) 0.03 0.04   LYMPHS ABS 0.88 0.82   MONOS ABS 0.28 0.32   EOS ABS 0.01 0.09   MCV 91.4 91.5   LACTATE  --  1.3   PROTIME  --  13.1   APTT  --  28.9   D DIMER QUANT  --  0.91*         Lab 10/06/23  0354 10/06/23  0138 10/05/23  2328   SODIUM 141  --  139   POTASSIUM 3.8  --  4.0   CHLORIDE 104  --  106   CO2 27.0  --  25.0   ANION GAP 10.0  --  8.0   BUN 16  --  18   CREATININE 0.72  --  0.75   EGFR 83.1  --  79.1   GLUCOSE 133*  --  198*   CALCIUM 9.6  --  9.3   MAGNESIUM 2.3  --  2.2   HEMOGLOBIN A1C 5.20  --   --    TSH  --  0.856  --          Lab 10/06/23  0354 10/05/23  2328   TOTAL PROTEIN 7.3 7.1   ALBUMIN 4.0 3.9   GLOBULIN 3.3 3.2   ALT (SGPT) 10 9   AST (SGOT) 15 16   BILIRUBIN 0.2 0.2   ALK PHOS 90 86   LIPASE  --  50         Lab 10/06/23  0354 10/06/23  0138 10/05/23  2328   PROBNP  --   --  438.0   HSTROP T 14* 15* 15*   PROTIME  --   --  13.1   INR  --   --  0.98         Lab 10/06/23  0354   CHOLESTEROL 185   LDL CHOL 97   HDL CHOL 74*   TRIGLYCERIDES 75             Brief Urine Lab Results  (Last result in the past 365 days)        Color   Clarity   Blood   Leuk Est   Nitrite   Protein   CREAT   Urine HCG        10/06/23 0008 Yellow   Clear   Negative   Negative   Negative   Negative                   Microbiology Results Abnormal       None            MRI Brain Without Contrast    Result Date: 10/6/2023  MRI BRAIN WO CONTRAST Date of Exam: 10/6/2023 7:05 PM EDT Indication: Stroke, follow up.  Comparison: None available. Technique:  Routine multiplanar/multisequence sequence images of the brain were obtained without contrast administration. FINDINGS: There is a small punctate focus of abnormal diffusion restriction within the periventricular white matter adjacent to the posterior horn of the right lateral ventricle within the posterior right frontal lobe. Increased T2 and FLAIR signal are  noted. The findings suggest a small focus of acute to subacute ischemia. Advanced diffuse white matter signal changes are noted indicating chronic small vessel ischemic changes or age-related changes. Associated diffuse volume loss is noted. Scattered foci of susceptibility artifact are noted suggesting underlying amyloid angiopathy. There is no evidence for significant abnormal cerebral edema. There is no mass effect or midline shift. The ventricular system is nondilated. The basilar cisterns are patent. The midline structures are unremarkable. No significant extra-axial  fluid collections are identified. No significant abnormal signal is observed involving the paranasal sinuses or mastoid air cells.     Impression: 1.Evidence for a punctate focus of acute/subacute ischemic change within the periventricular white matter adjacent to the posterior horn of the right lateral ventricle at the posterior aspect of the right frontal lobe. 2.Extensive diffuse nonspecific white matter signal changes are noted with associated diffuse volume loss. These findings are most consistent with extensive chronic small vessel ischemic changes or age-related changes. Scattered susceptibility artifact is also noted suggesting underlying amyloid angiopathy. Electronically Signed: Leandro Chávez MD  10/6/2023 7:41 PM EDT  Workstation ID: [id number]     Results for orders placed during the hospital encounter of 06/30/22    Adult Transthoracic Echo Complete W/ Cont if Necessary Per Protocol    Interpretation Summary  ú Calculated left ventricular EF = 62.6%  ú Left ventricular wall thickness is consistent with moderate to severe septal asymmetric hypertrophy. Sigmoid-shaped ventricular septum is present  ú No LVOT obstruction.  ú Mild aortic valve stenosis is present. Mean gradient 23 mmHg dimensionless index 0.30. Relatively stable findings compared with 2020 echo  ú There is mild paravalvular aortic regurgitation noted. Pressure half-time  633 ms.      Current medications:  Scheduled Meds:aspirin, 81 mg, Oral, Daily  atorvastatin, 80 mg, Oral, Nightly  metoprolol succinate XL, 50 mg, Oral, Daily  sodium chloride, 10 mL, Intravenous, Q12H      Continuous Infusions:   PRN Meds:.  acetaminophen    Calcium Replacement - Follow Nurse / BPA Driven Protocol    hydrALAZINE    HYDROcodone-acetaminophen    influenza vaccine    Magnesium Standard Dose Replacement - Follow Nurse / BPA Driven Protocol    nitroglycerin    ondansetron    Phosphorus Replacement - Follow Nurse / BPA Driven Protocol    Potassium Replacement - Follow Nurse / BPA Driven Protocol    sodium chloride    sodium chloride    Assessment & Plan   Assessment & Plan     Active Hospital Problems    Diagnosis  POA    **Hypertensive urgency [I16.0]  Yes    CVA (cerebral vascular accident) [I63.9]  Yes      Resolved Hospital Problems   No resolved problems to display.        Brief Hospital Course to date:  Aretha Angela is a 83 y.o. female admitted with severe upper back pain after eating red beans and rice at a restaurant.  Before she left the restaurant she had severe pain.  She had difficulty ambulating.  The symptoms were associated with nausea and diarrhea.  She presented to the emergency department for evaluation.  She complained of difficulty with her arms complaining of weakness and discomfort.  For this reason stroke was considered and CT a imaging of her head and neck was performed.  This was essentially normal.  Her blood pressure was significantly elevated with systolic up to 221 and diastolic up to 137.  Her symptoms improved after being given morphine.  Subsequently she was started on a Cardene drip however blood pressure normalized and she became hypotensive.  Cardene drip was then stopped.  Further work-up with MRI shows acute versus subacute frontal lobe CVA.    HTN Urgency  TIA  CVA frontal lobe lesion seen on MRI (acute vs subacute)   -- MRI with front infarct (acute vs  subacute)  -- other workup negative thus far  -- stroke neuro has seen and does not feel symptoms correlate to MRI findings  -- continue ASA, HD statin, 6 month f/u with stroke neuro clinic   -- PT recommending inpatient rehab, patient reluctant  -- Discussed with patient's son options for rehab versus home with 24-hour caregiver  -Echo is pending    Neck/back/shoulder pain  - May be secondary to uncontrolled hypertension versus possible acid reflux or esophagitis  - resolved     History of aortic bioprosthetic valve placed in 2010, due to severe aortic stenosis  - She states she is followed for this as an outpatient by cardiologist here.  - Continue telemetry  -Echo pending      Expected Discharge Location and Transportation: home vs rehab  Expected Discharge   Expected Discharge Date: 10/9/2023; Expected Discharge Time:      DVT prophylaxis:  Mechanical DVT prophylaxis orders are present.     AM-PAC 6 Clicks Score (PT): 18 (10/07/23 2000)    CODE STATUS:   Code Status and Medical Interventions:   Ordered at: 10/06/23 0230     Level Of Support Discussed With:    Patient     Code Status (Patient has no pulse and is not breathing):    CPR (Attempt to Resuscitate)     Medical Interventions (Patient has pulse or is breathing):    Full Support       Beth Warner MD  10/08/23

## 2023-10-08 NOTE — PROGRESS NOTES
Stroke Progress Note       Chief Complaint: Punctate right middle lobe infarct    Subjective     Subjective:  Aretha Angela was seen and examined at bedside alone. All questions and concerns were answered.       Objective      Temp:  [97.2 øF (36.2 øC)-98.5 øF (36.9 øC)] 98.3 øF (36.8 øC)  Heart Rate:  [52-63] 63  Resp:  [18] 18  BP: (135-213)/(61-96) 152/71            Objective    Physical Exam:  General Appearance: Alert  Eyes: Anicteric sclera  HEENT: no scleral injection  Lungs: respirations appear comfortable, no obvious increased work of breathing  Extremities: No cyanosis or fingernail clubbing   Skin: No rashes in exposed skin areas     Neurological Examination:   Mental status: Alert and oriented to person, place, and time. Speech with no dysarthria, able to name and repeat with no difficulty.    Cranial Nerves: Visual fields intact. Extraocular movements are intact with no nystagmus. Facial sensation intact. Face symmetrical. Hearing grossly intact. Palate movement is symmetric. Full shoulder shrug bilaterally. Tongue protrudes midline.   Sensory: Sensory exam to light touch in all four extremities distally is normal. Double simultaneous sensory stimulation shows no extinction  Motor: Normal tone throughout. Normal bulk. Pronator drift is absent.  Left upper extremity: 5/5 deltoid, tricep, bicep, interosseous, hand .   Right upper extremity: 5/5 deltoid, tricep, bicep, interosseous, hand .   Left lower extremity: 5/5 iliopsoas, knee extension/flexion, foot dorsi/plantarflexion.  Right lower extremity: 5/5 iliopsoas, knee extension/flexion, foot dorsi/plantarflexion.  Cerebellar: Finger-to-nose intact. Heel-to-shin intact. Rapid alternating movements are intact.   Gait: Not assessed.       Results Review:      CT head 10/6/2023.  Impression: Mild atrophy and chronic microvascular ischemia.  No acute intracranial process.    CT angiogram head and neck 10/6/2023.  Impression: No acute large vessel  occlusion, stenoses, aneurysm, or vascular malformation.    MRI brain without contrast 10/6/2023.  Impression: 1. Evidence for a punctate focus of acute/subacute ischemic change within the periventricular white matter adjacent to the posterior horn of the right lateral ventricle at the posterior aspect of the right frontal lobe. 2. Extensive diffuse nonspecific white matter signal changes are noted with associated diffuse volume loss. These findings are most consistent with extensive chronic small vessel ischemic changes or age-related changes. Scattered susceptibility artifact   is also noted suggesting underlying amyloid angiopathy.      Labs:  Lab Results   Component Value Date    HGBA1C 5.20 10/06/2023      Lab Results   Component Value Date    CHOL 185 10/06/2023    TRIG 75 10/06/2023    HDL 74 (H) 10/06/2023    LDL 97 10/06/2023     LIVER FUNCTION TESTS:      Lab 10/06/23  0354 10/05/23  2328   TOTAL PROTEIN 7.3 7.1   ALBUMIN 4.0 3.9   GLOBULIN 3.3 3.2   ALT (SGPT) 10 9   AST (SGOT) 15 16   BILIRUBIN 0.2 0.2   ALK PHOS 90 86   LIPASE  --  50                 Assessment/Plan     Assessment:  Aretha Angela is an 83-year-old female with a past medical history of hypertension and aortic bioprosthetic valve replacement who presented to [facility] Emergency Department on 10/5/2023 with acute right upper extremity weakness.  Stroke neurology was consulted for further evaluation.  CT head 10/6/2023 no acute intracranial process.  CT angiogram head and neck 10/6/2023 no large vessel occlusion, stenosis, aneurysm, or vascular malformation.  MRI brain without contrast 10/6/2023 shows punctate right frontal lobe infarct.    # Transient right upper extremity weakness  Etiology concerning for transient ischemic attack and do not correlate with the patient's findings on MRI.    # Punctate right frontal lobe infarct  The etiology of the patient's infarct is possibly incidental, however she does have an aortic  bioprosthetic valve which could suggest cardioembolic etiology    -Speech therapy cleared the patient to swallow  -Continue aspirin 81 mg daily  -Continue atorvastatin 80 mg daily (LDL 97)  -Transthoracic echocardiogram final read pending  -Follow-up in stroke neurology clinic in 6 months after discharge (added to patient ADT summary)  -PT/OT recommendation: Inpatient rehabilitation facility  -Sequential compression devices in place for deep vein thrombosis prophylaxis     -Discussed with patient that unfortunately, even with the best medical management, repeat stroke risk will never be zero.    -Stroke education counseling provided: common manifestations of stroke include unilateral face, arm, or leg weakness, numbness, or paresthesias, unilateral facial droop, speech deficits (dysarthria, aphasia), acute unremitting dizziness with nausea, vomiting, nystagmus, and incoordination, visual deficits (amarosis fugax or hemianopsia), or severe onset headache    Discharge Planning: stroke neurology will follow results of transthoracic echocardiogram    Radha Prieto MD  [facility] [place] [facility]  10/08/23  11:58 EDT    Addendum 2:50 PM: Transthoracic echocardiogram reviewed.  Left ventricular ejection fraction greater than 70%, moderate concentric left ventricular hypertrophy.  21 mm bovine prosthetic aortic valve with mild to moderate aortic insufficiency which appears stable compared to the previous study.  Transvalvular velocities are mild to moderately elevated and slightly higher than previous, likely due to hyperdynamic left ventricular systolic function.  Overall valve structure and function appear stable compared to previous study.  No thrombus mentioned.  Patient should follow-up with her outpatient cardiologist.  Patient is cleared to discharge to inpatient rehabilitation facility from stroke neurology perspective.

## 2023-10-08 NOTE — PLAN OF CARE
Goal Outcome Evaluation:                        Problem: Fall Injury Risk  Goal: Absence of Fall and Fall-Related Injury  Intervention: Identify and Manage Contributors  Recent Flowsheet Documentation  Taken 10/8/2023 0400 by Tam Richardson RN  Medication Review/Management: medications reviewed  Taken 10/8/2023 0200 by Tam Richardson RN  Medication Review/Management: medications reviewed  Taken 10/8/2023 0000 by Tam Richardson RN  Medication Review/Management: medications reviewed  Taken 10/7/2023 2200 by Tam Richardson RN  Medication Review/Management: medications reviewed  Taken 10/7/2023 2000 by aTm Richardson RN  Medication Review/Management: medications reviewed  Intervention: Promote Injury-Free Environment  Recent Flowsheet Documentation  Taken 10/8/2023 0400 by Tam Richardson RN  Safety Promotion/Fall Prevention:   activity supervised   safety round/check completed   toileting scheduled  Taken 10/8/2023 0200 by Tam Richardson RN  Safety Promotion/Fall Prevention:   activity supervised   safety round/check completed   toileting scheduled  Taken 10/8/2023 0000 by Tam Richardson RN  Safety Promotion/Fall Prevention:   nonskid shoes/slippers when out of bed   safety round/check completed  Taken 10/7/2023 2200 by Tam Richardson RN  Safety Promotion/Fall Prevention:   activity supervised   safety round/check completed   toileting scheduled  Taken 10/7/2023 2000 by Tam Richardson RN  Safety Promotion/Fall Prevention:   activity supervised   safety round/check completed   toileting scheduled     VSS, voids well, rested throughout the night, BP elevated, will continue to monitor for changes.

## 2023-10-09 PROCEDURE — 97535 SELF CARE MNGMENT TRAINING: CPT

## 2023-10-09 PROCEDURE — 97530 THERAPEUTIC ACTIVITIES: CPT

## 2023-10-09 RX ORDER — CLONIDINE HYDROCHLORIDE 0.1 MG/1
0.1 TABLET ORAL ONCE
Status: COMPLETED | OUTPATIENT
Start: 2023-10-09 | End: 2023-10-09

## 2023-10-09 RX ORDER — LISINOPRIL 5 MG/1
5 TABLET ORAL NIGHTLY
Status: DISCONTINUED | OUTPATIENT
Start: 2023-10-09 | End: 2023-10-10 | Stop reason: HOSPADM

## 2023-10-09 RX ADMIN — ATORVASTATIN CALCIUM 80 MG: 40 TABLET, FILM COATED ORAL at 20:43

## 2023-10-09 RX ADMIN — CLONIDINE HYDROCHLORIDE 0.1 MG: 0.1 TABLET ORAL at 06:47

## 2023-10-09 RX ADMIN — HYDROCODONE BITARTRATE AND ACETAMINOPHEN 1 TABLET: 5; 325 TABLET ORAL at 07:25

## 2023-10-09 RX ADMIN — NITROGLYCERIN 1 INCH: 20 OINTMENT TOPICAL at 05:24

## 2023-10-09 RX ADMIN — Medication 10 ML: at 09:17

## 2023-10-09 RX ADMIN — ASPIRIN 81 MG: 81 TABLET, COATED ORAL at 09:10

## 2023-10-09 RX ADMIN — HYDROCODONE BITARTRATE AND ACETAMINOPHEN 1 TABLET: 5; 325 TABLET ORAL at 00:50

## 2023-10-09 RX ADMIN — LISINOPRIL 5 MG: 5 TABLET ORAL at 20:43

## 2023-10-09 RX ADMIN — HYDROCODONE BITARTRATE AND ACETAMINOPHEN 1 TABLET: 5; 325 TABLET ORAL at 23:02

## 2023-10-09 NOTE — PROGRESS NOTES
[facility] Medicine Services  PROGRESS NOTE    Patient Name: Aretha Angela  : 1940  MRN: [id number]    Date of Admission: 10/5/2023  Primary Care Physician: Kt Heredia MD    Subjective   Subjective     CC:  CVA    HPI:  Feels fine.  Continues to say she would rather continue her current outpt rehab at the  than go to inpt rehab; however she agrees to discuss this with her son today.       Objective   Objective     Vital Signs:   Temp:  [97.3 øF (36.3 øC)-98.4 øF (36.9 øC)] 97.3 øF (36.3 øC)  Heart Rate:  [53-62] 53  Resp:  [17-20] 20  BP: (134-207)/() 182/75     Physical Exam:  Constitutional: No acute distress, awake, alert  HENT: NCAT, mucous membranes moist  Respiratory: Clear to auscultation bilaterally, respiratory effort normal   Cardiovascular: RRR, murmur   Gastrointestinal: NT ND   Musculoskeletal: No bilateral ankle edema  Psychiatric: Appropriate affect, cooperative  Neurologic: Oriented x 3, GARCIA, conversant w clear speech   Skin: No rashes        Results Reviewed:  LAB RESULTS:      Lab 10/06/23  0354 10/05/23  2328   WBC 7.52 7.87   HEMOGLOBIN 13.1 12.3   HEMATOCRIT 40.6 38.7   PLATELETS 217 195   NEUTROS ABS 6.28 6.55   IMMATURE GRANS (ABS) 0.03 0.04   LYMPHS ABS 0.88 0.82   MONOS ABS 0.28 0.32   EOS ABS 0.01 0.09   MCV 91.4 91.5   LACTATE  --  1.3   PROTIME  --  13.1   APTT  --  28.9   D DIMER QUANT  --  0.91*         Lab 10/06/23  0354 10/06/23  0138 10/05/23  2328   SODIUM 141  --  139   POTASSIUM 3.8  --  4.0   CHLORIDE 104  --  106   CO2 27.0  --  25.0   ANION GAP 10.0  --  8.0   BUN 16  --  18   CREATININE 0.72  --  0.75   EGFR 83.1  --  79.1   GLUCOSE 133*  --  198*   CALCIUM 9.6  --  9.3   MAGNESIUM 2.3  --  2.2   HEMOGLOBIN A1C 5.20  --   --    TSH  --  0.856  --          Lab 10/06/23  0354 10/05/23  2328   TOTAL PROTEIN 7.3 7.1   ALBUMIN 4.0 3.9   GLOBULIN 3.3 3.2   ALT (SGPT) 10 9   AST (SGOT) 15 16   BILIRUBIN 0.2 0.2   ALK PHOS 90 86    LIPASE  --  50         Lab 10/06/23  0354 10/06/23  0138 10/05/23  2328   PROBNP  --   --  438.0   HSTROP T 14* 15* 15*   PROTIME  --   --  13.1   INR  --   --  0.98         Lab 10/06/23  0354   CHOLESTEROL 185   LDL CHOL 97   HDL CHOL 74*   TRIGLYCERIDES 75             Brief Urine Lab Results  (Last result in the past 365 days)        Color   Clarity   Blood   Leuk Est   Nitrite   Protein   CREAT   Urine HCG        10/06/23 0008 Yellow   Clear   Negative   Negative   Negative   Negative                   Microbiology Results Abnormal       None            No radiology results from the last 24 hrs    Results for orders placed during the hospital encounter of 10/05/23    Adult Transthoracic Echo Limited W/ Cont if Necessary Per Protocol    Interpretation Summary    Left ventricular systolic function is hyperdynamic (EF > 70%). Left ventricular ejection fraction appears to be greater than 70%.    Left ventricular wall thickness is consistent with moderate concentric hypertrophy.    21 mm bovine prosthetic aortic valve with mild to moderate aortic insufficiency which appears stable compared to previous study.  Transvalvular velocities are mild to moderately elevated and slightly higher than previous, likely due to hyperdynamic LV systolic function.  Overall valve structure and function appear stable compared to previous study.    Peak velocity of the flow distal to the aortic valve is 327 cm/s. Aortic valve mean pressure gradient is 24 mmHg.      Current medications:  Scheduled Meds:aspirin, 81 mg, Oral, Daily  atorvastatin, 80 mg, Oral, Nightly  metoprolol succinate XL, 50 mg, Oral, Daily  sodium chloride, 10 mL, Intravenous, Q12H      Continuous Infusions:   PRN Meds:.  acetaminophen    Calcium Replacement - Follow Nurse / BPA Driven Protocol    hydrALAZINE    HYDROcodone-acetaminophen    influenza vaccine    Magnesium Standard Dose Replacement - Follow Nurse / BPA Driven Protocol    nitroglycerin    ondansetron     Phosphorus Replacement - Follow Nurse / BPA Driven Protocol    Potassium Replacement - Follow Nurse / BPA Driven Protocol    sodium chloride    sodium chloride    Assessment & Plan   Assessment & Plan     Active Hospital Problems    Diagnosis  POA    **Hypertensive urgency [I16.0]  Yes    CVA (cerebral vascular accident) [I63.9]  Yes      Resolved Hospital Problems   No resolved problems to display.        Brief Hospital Course to date:  Aretha Angela is a 83 y.o. female admitted with severe upper back pain after eating red beans and rice at a restaurant.  Before she left the restaurant she had severe pain.  She had difficulty ambulating.  The symptoms were associated with nausea and diarrhea.  She presented to the emergency department for evaluation.  She complained of difficulty with her arms complaining of weakness and discomfort.  For this reason stroke was considered and CT a imaging of her head and neck was performed.  This was essentially normal.  Her blood pressure was significantly elevated with systolic up to 221 and diastolic up to 137.  Her symptoms improved after being given morphine.  Subsequently she was started on a Cardene drip however blood pressure normalized and she became hypotensive.  Cardene drip was then stopped.  Further work-up with MRI shows acute versus subacute frontal lobe CVA.    CVA frontal lobe lesion seen on MRI (acute vs subacute)   -- other workup negative thus far  -- stroke neuro has seen and does not feel symptoms correlate to MRI findings  -- continue ASA, HD statin, 6 month f/u with stroke neuro clinic   -- PT recommending inpatient rehab, patient reluctant  -- Discussed with patient's son options for rehab versus home with 24-hour caregiver  -Echo EF 70.  Tissue AVR    HTN urgency  - again had high BP from 2300 to morning   - add norvasc QHS.       Neck/back/shoulder pain  - May be secondary to uncontrolled hypertension versus possible acid reflux or esophagitis  -  resolved     History of aortic bioprosthetic valve placed in 2010, due to severe aortic stenosis  - She states she is followed for this as an outpatient by cardiologist here.  - Continue telemetry  -Echo pending      Expected Discharge Location and Transportation: home vs rehab  Expected Discharge   Expected Discharge Date: 10/9/2023; Expected Discharge Time:      DVT prophylaxis:  Mechanical DVT prophylaxis orders are present.     AM-PAC 6 Clicks Score (PT): 18 (10/07/23 2000)    CODE STATUS:   Code Status and Medical Interventions:   Ordered at: 10/06/23 0230     Level Of Support Discussed With:    Patient     Code Status (Patient has no pulse and is not breathing):    CPR (Attempt to Resuscitate)     Medical Interventions (Patient has pulse or is breathing):    Full Support       Tayla So MD  10/09/23

## 2023-10-09 NOTE — THERAPY TREATMENT NOTE
Patient Name: Aretha Angela  : 1940    MRN: [id number]                              Today's Date: 10/9/2023       Admit Date: 10/5/2023    Visit Dx:     ICD-10-CM ICD-9-CM   1. Weakness of right upper extremity  R29.898 729.89   2. Hypertensive urgency  I16.0 401.9   3. Acute midline thoracic back pain  M54.6 724.1   4. Atypical chest pain  R07.89 786.59   5. Diarrhea, unspecified type  R19.7 787.91   6. Incontinence of feces, unspecified fecal incontinence type  R15.9 787.60   7. Elevated d-dimer  R79.89 790.92     Patient Active Problem List   Diagnosis    S/P aortic valve replacement with bioprosthetic valve    Hypertensive urgency    CVA (cerebral vascular accident)     Past Medical History:   Diagnosis Date    Chronic gastric ulcer     Dysphagia     GERD (gastroesophageal reflux disease)     Hypertension     Nausea      Past Surgical History:   Procedure Laterality Date    AORTIC VALVE REPAIR/REPLACEMENT      HYSTERECTOMY      KNEE ARTHROSCOPY        General Information       Row Name 10/09/23 0828          OT Time and Intention    Document Type therapy note (daily note)  -KF     Mode of Treatment occupational therapy;individual therapy  -       Row Name 10/09/23 0828          General Information    Patient Profile Reviewed yes  -KF     Existing Precautions/Restrictions fall  -KF     Barriers to Rehab previous functional deficit;cognitive status  -KF       Row Name 10/09/23 0828          Cognition    Orientation Status (Cognition) oriented x 4;other (see comments)  Conversational confusion  -       Row Name 10/09/23 0828          Safety Issues, Functional Mobility    Safety Issues Affecting Function (Mobility) awareness of need for assistance;impulsivity;insight into deficits/self-awareness;judgment;problem-solving;safety precaution awareness;safety precautions follow-through/compliance;sequencing abilities  -KF     Impairments Affecting Function (Mobility) balance;cognition;endurance/activity  tolerance;pain;strength  -KF     Cognitive Impairments, Mobility Safety/Performance awareness, need for assistance;impulsivity;insight into deficits/self-awareness;judgment;problem-solving/reasoning;safety precaution awareness;safety precaution follow-through;sequencing abilities  -KF               User Key  (r) = Recorded By, (t) = Taken By, (c) = Cosigned By      Initials Name Provider Type    KF Gogo Hernandez OT Occupational Therapist                     Mobility/ADL's       Row Name 10/09/23 0829          Bed Mobility    Bed Mobility supine-sit  -KF     Supine-Sit [place] (Bed Mobility) standby assist;verbal cues  -KF     Assistive Device (Bed Mobility) bed rails;head of bed elevated  -KF     Comment, (Bed Mobility) Verbal cues for task initiation, sequencing, and hand placement  -KF       Row Name 10/09/23 0829          Transfers    Transfers sit-stand transfer;stand-sit transfer  -KF       Row Name 10/09/23 0829          Sit-Stand Transfer    Sit-Stand [place] (Transfers) contact guard;1 person assist  -KF     Assistive Device (Sit-Stand Transfers) other (see comments)  no AD  -KF       Row Name 10/09/23 0829          Stand-Sit Transfer    Stand-Sit [place] (Transfers) contact guard;1 person assist  -KF     Assistive Device (Stand-Sit Transfers) other (see comments)  no AD  -KF       Row Name 10/09/23 0829          Functional Mobility    Functional Mobility- Ind. Level contact guard assist;minimum assist (75% patient effort);1 person  -KF     Functional Mobility- Device other (see comments)  no AD  -KF     Functional Mobility-Distance (Feet) --  In room ambulation for ADLs  -KF     Functional Mobility- Safety Issues balance decreased during turns  -KF     Functional Mobility- Comment Pt presents with short, at times shuffling steps. Overall unsteady, however had no overt LOB. Slight Johnny during turns in the bathroom, however this appeared primarily due to lack of safety awareness.  -KF        Row Name 10/09/23 0829          Activities of Daily Living    BADL Assessment/Intervention bathing;upper body dressing;lower body dressing;grooming;feeding  -KF       Row Name 10/09/23 0829          Upper Body Dressing Assessment/Training    [place] Level (Upper Body Dressing) doff;don;pajama/robe;set up  -KF     Position (Upper Body Dressing) edge of bed sitting  -KF       Row Name 10/09/23 0829          Lower Body Dressing Assessment/Training    [place] Level (Lower Body Dressing) doff;don;socks;set up  -KF     Position (Lower Body Dressing) edge of bed sitting  -KF       Row Name 10/09/23 0829          Grooming Assessment/Training    [place] Level (Grooming) grooming skills;oral care regimen;wash face, hands;set up;contact guard assist  -KF     Position (Grooming) sink side  -[facility] Name 10/09/23 0829          Bathing Assessment/Intervention    [place] Level (Bathing) bathing skills;lower body;proximal lower extremities;perineal area;contact guard assist;verbal cues  -KF     Position (Bathing) supported standing;other (see comments)  -KF     Comment, (Bathing) Verbal cues for safety awareness. Pt declined completion of bathing while sitting, performed in standing while sink side.  -KF       Row Name 10/09/23 0829          Self-Feeding Assessment/Training    [place] Level (Feeding) feeding skills;liquids to mouth;prepare tray/open items;scoop food and bring to mouth;set up  -KF     Position (Self-Feeding) supported sitting  -KF               User Key  (r) = Recorded By, (t) = Taken By, (c) = Cosigned By      Initials Name Provider Type    KF Gogo Hernandez OT Occupational Therapist                   Obj/Interventions       Row Name 10/09/23 0834          Balance    Balance Assessment sitting static balance;sitting dynamic balance;standing static balance;standing dynamic balance  -KF     Static Sitting Balance supervision  -KF     Dynamic Sitting Balance standby assist  -KF      Position, Sitting Balance unsupported;sitting edge of bed  -KF     Static Standing Balance contact guard  -KF     Dynamic Standing Balance contact guard;minimal assist  -KF     Position/Device Used, Standing Balance unsupported  -KF     Balance Interventions sitting;standing;supported;static;dynamic;occupation based/functional task  -KF               User Key  (r) = Recorded By, (t) = Taken By, (c) = Cosigned By      Initials Name Provider Type    KF Gogo Hernandez OT Occupational Therapist                   Goals/Plan    No documentation.                  Clinical Impression       Row Name 10/09/23 0835          Pain Assessment    Pretreatment Pain Rating 3/10  -KF     Posttreatment Pain Rating 3/10  -KF     Pain Location - Side/Orientation Right  -KF     Pain Location generalized  -KF     Pain Location - shoulder  -KF     Pain Intervention(s) Repositioned;Ambulation/increased activity  -KF       Row Name 10/09/23 0835          Plan of Care Review    Plan of Care Reviewed With patient  -KF     Progress improving  -KF     Outcome Evaluation Pt participated well during OT session today. Bed mobility completed SBA and in room ambulation to/from the bathroom completed with CGA, on AD. Pt had no overt LOB, however was unsteady throughout. ADLs completed while standing sink side with set up and CGA. Pt had one mild LOB during ADLs which required Johnny to correct. Pt remains below her baseline with deficits in safety awareness, balance, strength, and activity tolerance. Pt will benefit from continued IP OT services to assist in restoring PLOF. Considering pt's home set up and that she lives alone with poor safety awareness, recommend a d/c to IRF for best functional outcome.  -KF       Row Name 10/09/23 0835          Therapy Assessment/Plan (OT)    Rehab Potential (OT) good, to achieve stated therapy goals  -KF     Criteria for Skilled Therapeutic Interventions Met (OT) yes;skilled treatment is necessary  -KF     Therapy  Frequency (OT) daily  -KF       Row Name 10/09/23 0835          Therapy Plan Review/Discharge Plan (OT)    Anticipated Discharge Disposition (OT) inpatient rehabilitation facility  -KF       Row Name 10/09/23 0835          Vital Signs    Pre Systolic BP Rehab 163  -KF     Pre Treatment Diastolic BP 74  -KF     Pretreatment Heart Rate (beats/min) 50  -KF     Pre SpO2 (%) 97  -KF     O2 Delivery Pre Treatment room air  -KF     Pre Patient Position Supine  -KF     Intra Patient Position Standing  -KF     Post Patient Position Sitting  -KF       Row Name 10/09/23 0835          Positioning and Restraints    Pre-Treatment Position in bed  -KF     Post Treatment Position chair  -KF     In Chair reclined;call light within reach;encouraged to call for assist;exit alarm on;waffle cushion  -KF               User Key  (r) = Recorded By, (t) = Taken By, (c) = Cosigned By      Initials Name Provider Type    Gogo Fuller OT Occupational Therapist                   Outcome Measures       Row Name 10/09/23 0839          How much help from another is currently needed...    Putting on and taking off regular lower body clothing? 3  -KF     Bathing (including washing, rinsing, and drying) 3  -KF     Toileting (which includes using toilet bed pan or urinal) 3  -KF     Putting on and taking off regular upper body clothing 4  -KF     Taking care of personal grooming (such as brushing teeth) 3  -KF     Eating meals 4  -KF     AM-PAC 6 Clicks Score (OT) 20  -KF       Row Name 10/09/23 0839          Functional Assessment    Outcome Measure Options AM-PAC 6 Clicks Daily Activity (OT)  -KF               User Key  (r) = Recorded By, (t) = Taken By, (c) = Cosigned By      Initials Name Provider Type    Gogo Fuller OT Occupational Therapist                    Occupational Therapy Education       Title: PT OT SLP Therapies (In Progress)       Topic: Occupational Therapy (In Progress)       Point: ADL training (Done)        Description:   Instruct learner(s) on proper safety adaptation and remediation techniques during self care or transfers.   Instruct in proper use of assistive devices.                  Learning Progress Summary             Patient Acceptance, E,TB, VU by  at 10/9/2023 0758    Acceptance, E,TB, VU by  at 10/6/2023 0853                         Point: Home exercise program (Not Started)       Description:   Instruct learner(s) on appropriate technique for monitoring, assisting and/or progressing therapeutic exercises/activities.                  Learner Progress:  Not documented in this visit.              Point: Precautions (Done)       Description:   Instruct learner(s) on prescribed precautions during self-care and functional transfers.                  Learning Progress Summary             Patient Acceptance, E,TB, VU by  at 10/9/2023 0758    Acceptance, E,TB, VU by  at 10/6/2023 0853                         Point: Body mechanics (Done)       Description:   Instruct learner(s) on proper positioning and spine alignment during self-care, functional mobility activities and/or exercises.                  Learning Progress Summary             Patient Acceptance, E,TB, VU by  at 10/9/2023 0758    Acceptance, E,TB, VU by  at 10/6/2023 0853                                         User Key       Initials Effective Dates Name Provider Type Discipline     08/09/23 -  Gogo Hernandez, EUNICE Occupational Therapist OT                  OT Recommendation and Plan  Planned Therapy Interventions (OT): activity tolerance training, adaptive equipment training, BADL retraining, functional balance retraining, IADL retraining, occupation/activity based interventions, patient/caregiver education/training, ROM/therapeutic exercise, strengthening exercise, transfer/mobility retraining  Therapy Frequency (OT): daily  Plan of Care Review  Plan of Care Reviewed With: patient  Progress: improving  Outcome Evaluation: Pt participated  well during OT session today. Bed mobility completed SBA and in room ambulation to/from the bathroom completed with CGA, on AD. Pt had no overt LOB, however was unsteady throughout. ADLs completed while standing sink side with set up and CGA. Pt had one mild LOB during ADLs which required Johnny to correct. Pt remains below her baseline with deficits in safety awareness, balance, strength, and activity tolerance. Pt will benefit from continued IP OT services to assist in restoring PLOF. Considering pt's home set up and that she lives alone with poor safety awareness, recommend a d/c to IRF for best functional outcome.     Time Calculation:   Evaluation Complexity (OT)  Review Occupational Profile/Medical/Therapy History Complexity: brief/low complexity  Assessment, Occupational Performance/Identification of Deficit Complexity: 1-3 performance deficits  Clinical Decision Making Complexity (OT): problem focused assessment/low complexity  Overall Complexity of Evaluation (OT): low complexity     Time Calculation- OT       Row Name 10/09/23 0840             Time Calculation- OT    OT Start Time 0758  -KF      OT Received On 10/09/23  -KF      OT Goal Re-Cert Due Date 10/16/23  -KF         Timed Charges    68198 - Gait Training Minutes  --  -KF      00088 - OT Therapeutic Activity Minutes 8  -KF      48714 - OT Self Care/Mgmt Minutes 16  -KF         Total Minutes    Timed Charges Total Minutes 24  -KF       Total Minutes 24  -KF                User Key  (r) = Recorded By, (t) = Taken By, (c) = Cosigned By      Initials Name Provider Type    Gogo Fuller OT Occupational Therapist                  Therapy Charges for Today       Code Description Service Date Service Provider Modifiers Qty    [id number]  OT THERAPEUTIC ACT EA 15 MIN 10/9/2023 Gogo Hernandez OT GO 1    [id number]  OT SELF CARE/MGMT/TRAIN EA 15 MIN 10/9/2023 Gogo Hernandez OT GO 1                 Gogo Hernandez OT  10/9/2023

## 2023-10-09 NOTE — CASE MANAGEMENT/SOCIAL WORK
Continued Stay Note  [facility]     Patient Name: Aretha Angela  MRN: [id number]  Today's Date: 10/9/2023    Admit Date: 10/5/2023    Plan: IPR   Discharge Plan       Row Name 10/09/23 1316       Plan    Plan IPR    Patient/Family in Agreement with Plan yes    Plan Comments Wyatt met with Ms. Angela at the bedside today to discuss the discharge plan which now includes recommendations from PT that consider inpatient rehab.  She agrees however reluctantly that it is needed.  She states that she will go to [facility].  She forgets what we have discussed moments after she is agreeable.  She gives me permission to call her son.  I have spoken to her son Gen who is agreeable to [facility] referral.  I have confirmed with April at [facility] that she will review.  CM will cont to follow the plan of care and assist with discharge planning as appropriate.    Final Discharge Disposition Code 62 - inpatient rehab facility                   Discharge Codes    No documentation.                 Expected Discharge Date and Time       Expected Discharge Date Expected Discharge Time    Oct 9, 2023               Ayde Momin RN

## 2023-10-09 NOTE — PLAN OF CARE
Goal Outcome Evaluation:  Plan of Care Reviewed With: patient        Progress: no change  Outcome Evaluation: Remains sinus to sinus jaylen on the monitor. NIHSS 0. BP controlled today with oral medications. Lisinopril added to evening medications. Waiting a discharge plan SNF verses Home. Jeff rodríguez.

## 2023-10-09 NOTE — PLAN OF CARE
Goal Outcome Evaluation:  Plan of Care Reviewed With: patient        Progress: improving  Outcome Evaluation: Pt participated well during OT session today. Bed mobility completed SBA and in room ambulation to/from the bathroom completed with CGA, on AD. Pt had no overt LOB, however was unsteady throughout. ADLs completed while standing sink side with set up and CGA. Pt had one mild LOB during ADLs which required Johnny to correct. Pt remains below her baseline with deficits in safety awareness, balance, strength, and activity tolerance. Pt will benefit from continued IP OT services to assist in restoring PLOF. Considering pt's home set up and that she lives alone with poor safety awareness, recommend a d/c to IRF for best functional outcome.      Anticipated Discharge Disposition (OT): inpatient rehabilitation facility

## 2023-10-10 ENCOUNTER — READMISSION MANAGEMENT (OUTPATIENT)
Dept: CALL CENTER | Facility: HOSPITAL | Age: 83
End: 2023-10-10
Payer: MEDICARE

## 2023-10-10 VITALS
DIASTOLIC BLOOD PRESSURE: 88 MMHG | BODY MASS INDEX: 22.2 KG/M2 | OXYGEN SATURATION: 97 % | SYSTOLIC BLOOD PRESSURE: 159 MMHG | HEART RATE: 66 BPM | HEIGHT: 64 IN | WEIGHT: 130 LBS | TEMPERATURE: 98.1 F | RESPIRATION RATE: 18 BRPM

## 2023-10-10 LAB — CREAT BLDA-MCNC: 0.8 MG/DL (ref 0.6–1.3)

## 2023-10-10 PROCEDURE — G0008 ADMIN INFLUENZA VIRUS VAC: HCPCS | Performed by: INTERNAL MEDICINE

## 2023-10-10 PROCEDURE — 90662 IIV NO PRSV INCREASED AG IM: CPT | Performed by: INTERNAL MEDICINE

## 2023-10-10 PROCEDURE — 25010000002 INFLUENZA VAC HIGH-DOSE QUAD 0.7 ML SUSPENSION PREFILLED SYRINGE: Performed by: INTERNAL MEDICINE

## 2023-10-10 RX ORDER — LISINOPRIL 5 MG/1
5 TABLET ORAL NIGHTLY
Qty: 30 TABLET | Refills: 11 | Status: SHIPPED | OUTPATIENT
Start: 2023-10-10

## 2023-10-10 RX ORDER — BISACODYL 5 MG/1
5 TABLET, DELAYED RELEASE ORAL DAILY PRN
Status: DISCONTINUED | OUTPATIENT
Start: 2023-10-10 | End: 2023-10-10 | Stop reason: HOSPADM

## 2023-10-10 RX ORDER — POLYETHYLENE GLYCOL 3350 17 G/17G
17 POWDER, FOR SOLUTION ORAL DAILY PRN
Status: DISCONTINUED | OUTPATIENT
Start: 2023-10-10 | End: 2023-10-10 | Stop reason: HOSPADM

## 2023-10-10 RX ORDER — ATORVASTATIN CALCIUM 80 MG/1
80 TABLET, FILM COATED ORAL NIGHTLY
Qty: 90 TABLET | Refills: 3 | Status: SHIPPED | OUTPATIENT
Start: 2023-10-10

## 2023-10-10 RX ORDER — AMOXICILLIN 250 MG
2 CAPSULE ORAL 2 TIMES DAILY
Status: DISCONTINUED | OUTPATIENT
Start: 2023-10-10 | End: 2023-10-10 | Stop reason: HOSPADM

## 2023-10-10 RX ORDER — BISACODYL 10 MG
10 SUPPOSITORY, RECTAL RECTAL DAILY PRN
Status: DISCONTINUED | OUTPATIENT
Start: 2023-10-10 | End: 2023-10-10 | Stop reason: HOSPADM

## 2023-10-10 RX ADMIN — Medication 10 ML: at 09:10

## 2023-10-10 RX ADMIN — BISACODYL 10 MG: 10 SUPPOSITORY RECTAL at 06:49

## 2023-10-10 RX ADMIN — METOPROLOL SUCCINATE 50 MG: 50 TABLET, EXTENDED RELEASE ORAL at 09:07

## 2023-10-10 RX ADMIN — ASPIRIN 81 MG: 81 TABLET, COATED ORAL at 09:07

## 2023-10-10 RX ADMIN — INFLUENZA A VIRUS A/VICTORIA/4897/2022 IVR-238 (H1N1) ANTIGEN (FORMALDEHYDE INACTIVATED), INFLUENZA A VIRUS A/DARWIN/9/2021 SAN-010 (H3N2) ANTIGEN (FORMALDEHYDE INACTIVATED), INFLUENZA B VIRUS B/PHUKET/3073/2013 ANTIGEN (FORMALDEHYDE INACTIVATED), AND INFLUENZA B VIRUS B/MICHIGAN/01/2021 ANTIGEN (FORMALDEHYDE INACTIVATED) 0.7 ML: 60; 60; 60; 60 INJECTION, SUSPENSION INTRAMUSCULAR at 16:35

## 2023-10-10 RX ADMIN — POLYETHYLENE GLYCOL 3350 17 G: 17 POWDER, FOR SOLUTION ORAL at 06:49

## 2023-10-10 NOTE — PLAN OF CARE
Problem: Fall Injury Risk  Goal: Absence of Fall and Fall-Related Injury  Outcome: Met  Intervention: Promote Injury-Free Environment  Recent Flowsheet Documentation  Taken 10/10/2023 1425 by Mary Ann Bullard RN  Safety Promotion/Fall Prevention:   assistive device/personal items within reach   clutter free environment maintained   toileting scheduled   safety round/check completed  Taken 10/10/2023 1214 by Mary Ann Bullard RN  Safety Promotion/Fall Prevention:   assistive device/personal items within reach   clutter free environment maintained   toileting scheduled   safety round/check completed  Taken 10/10/2023 1014 by Mary Ann Bullard RN  Safety Promotion/Fall Prevention:   assistive device/personal items within reach   clutter free environment maintained   toileting scheduled   safety round/check completed  Taken 10/10/2023 0907 by Mary Ann Bullard RN  Safety Promotion/Fall Prevention:   assistive device/personal items within reach   clutter free environment maintained   toileting scheduled   safety round/check completed  Taken 10/10/2023 0703 by Mary Ann Bullard RN  Safety Promotion/Fall Prevention:   assistive device/personal items within reach   clutter free environment maintained   toileting scheduled   safety round/check completed     Problem: Skin Injury Risk Increased  Goal: Skin Health and Integrity  Outcome: Met  Intervention: Optimize Skin Protection  Recent Flowsheet Documentation  Taken 10/10/2023 1425 by Mary Ann Bullard RN  Pressure Reduction Techniques: frequent weight shift encouraged  Head of Bed (HOB) Positioning: HOB at 20-30 degrees  Pressure Reduction Devices: pressure-redistributing mattress utilized  Skin Protection:   adhesive use limited   incontinence pads utilized  Taken 10/10/2023 1214 by Mary Ann Bullard RN  Pressure Reduction Techniques: frequent weight shift encouraged  Head of Bed (HOB) Positioning: HOB at 20-30 degrees  Pressure Reduction Devices:  pressure-redistributing mattress utilized  Skin Protection:   adhesive use limited   incontinence pads utilized  Taken 10/10/2023 1014 by Mary Ann Bullard RN  Pressure Reduction Techniques: frequent weight shift encouraged  Head of Bed (HOB) Positioning: HOB at 20-30 degrees  Pressure Reduction Devices: pressure-redistributing mattress utilized  Skin Protection:   adhesive use limited   incontinence pads utilized  Taken 10/10/2023 0907 by Mary Ann Bullard RN  Pressure Reduction Techniques: frequent weight shift encouraged  Head of Bed (HOB) Positioning: HOB at 20-30 degrees  Pressure Reduction Devices: pressure-redistributing mattress utilized  Skin Protection:   adhesive use limited   incontinence pads utilized  Taken 10/10/2023 0703 by Mary Ann Bullard RN  Pressure Reduction Techniques: frequent weight shift encouraged  Head of Bed (HOB) Positioning: HOB at 20-30 degrees  Pressure Reduction Devices: pressure-redistributing mattress utilized  Skin Protection:   adhesive use limited   incontinence pads utilized     Problem: Hypertension Comorbidity  Goal: Blood Pressure in Desired Range  Outcome: Met     Problem: Adult Inpatient Plan of Care  Goal: Plan of Care Review  Outcome: Met  Flowsheets (Taken 10/10/2023 1501)  Progress: improving  Plan of Care Reviewed With: patient  Outcome Evaluation: Ready for discharge. IV discontinued. No bleeding noted at the site. Pt given discharge instructions and follow up appointments. Verbalizes understanding.     Problem: Adjustment to Illness (Stroke, Ischemic/Transient Ischemic Attack)  Goal: Optimal Coping  Outcome: Met     Problem: Bowel Elimination Impaired (Stroke, Ischemic/Transient Ischemic Attack)  Goal: Effective Bowel Elimination  Outcome: Met     Problem: Cerebral Tissue Perfusion (Stroke, Ischemic/Transient Ischemic Attack)  Goal: Optimal Cerebral Tissue Perfusion  Outcome: Met     Problem: Cognitive Impairment (Stroke, Ischemic/Transient Ischemic  Attack)  Goal: Optimal Cognitive Function  Outcome: Met     Problem: Communication Impairment (Stroke, Ischemic/Transient Ischemic Attack)  Goal: Improved Communication Skills  Outcome: Met     Problem: Functional Ability Impaired (Stroke, Ischemic/Transient Ischemic Attack)  Goal: Optimal Functional Ability  Outcome: Met  Intervention: Optimize Functional Ability  Recent Flowsheet Documentation  Taken 10/10/2023 1425 by Mary Ann Bullard, RN  Activity Management: bedrest  Taken 10/10/2023 1214 by Mary Ann Bullard, RN  Activity Management: bedrest  Taken 10/10/2023 1014 by Mary Ann Bullard, RN  Activity Management: bedrest  Taken 10/10/2023 0907 by Mary Ann Bullard RN  Activity Management: bedrest  Taken 10/10/2023 0703 by Mary Ann Bullard RN  Activity Management: bedrest   Goal Outcome Evaluation:  Plan of Care Reviewed With: patient        Progress: improving  Outcome Evaluation: Ready for discharge. IV discontinued. No bleeding noted at the site. Pt given discharge instructions and follow up appointments. Verbalizes understanding.

## 2023-10-10 NOTE — CASE MANAGEMENT/SOCIAL WORK
Continued Stay Note  [facility]     Patient Name: Aretha Angela  MRN: [id number]  Today's Date: 10/10/2023    Admit Date: 10/5/2023    Plan: IPR   Discharge Plan       Row Name 10/10/23 1036       Plan    Plan Comments At this time pt has decided to return home and has declined inpt rehab. Son is in agreement with plan. No discharge needs at this time    Final Discharge Disposition Code 01 - home or self-care                   Discharge Codes    No documentation.                 Expected Discharge Date and Time       Expected Discharge Date Expected Discharge Time    Oct 9, 2023               Sena Donahue RN

## 2023-10-10 NOTE — PLAN OF CARE
Goal Outcome Evaluation:                        Problem: Fall Injury Risk  Goal: Absence of Fall and Fall-Related Injury  Intervention: Identify and Manage Contributors  Recent Flowsheet Documentation  Taken 10/10/2023 0400 by Tam Richardson RN  Medication Review/Management: medications reviewed  Taken 10/10/2023 0200 by Tam Richardson RN  Medication Review/Management: medications reviewed  Taken 10/10/2023 0000 by Tam Richardson RN  Medication Review/Management: medications reviewed  Taken 10/9/2023 2200 by Tam Richardson RN  Medication Review/Management: medications reviewed  Taken 10/9/2023 2000 by Tam Richardson RN  Medication Review/Management: medications reviewed  Intervention: Promote Injury-Free Environment  Recent Flowsheet Documentation  Taken 10/10/2023 0400 by Tam Richardson RN  Safety Promotion/Fall Prevention:   activity supervised   safety round/check completed   toileting scheduled  Taken 10/10/2023 0200 by Tam Richardson RN  Safety Promotion/Fall Prevention:   activity supervised   safety round/check completed   toileting scheduled  Taken 10/10/2023 0000 by Tam Richardson RN  Safety Promotion/Fall Prevention:   activity supervised   safety round/check completed   toileting scheduled  Taken 10/9/2023 2200 by Tam Richardson RN  Safety Promotion/Fall Prevention:   activity supervised   safety round/check completed   toileting scheduled  Taken 10/9/2023 2000 by Tam Richardson RN  Safety Promotion/Fall Prevention:   activity supervised   safety round/check completed   toileting scheduled     VSS, voids well, ambulates to bathroom, rested throughout the night, pain managed with PRN medications, will continue to monitor for changes.

## 2023-10-10 NOTE — DISCHARGE SUMMARY
[facility] Medicine Services  DISCHARGE SUMMARY    Patient Name: Aretha Angela  : 1940  MRN: [id number]    Date of Admission: 10/5/2023 10:38 PM  Date of Discharge:  10/10/2023  Primary Care Physician: Kt Heredia MD    Consults       Date and Time Order Name Status Description    10/7/2023  9:01 AM Inpatient Neurology Consult Stroke      10/6/2023 12:49 AM Inpatient Neurology Consult Stroke Completed             Hospital Course     Presenting Problem: acute neck pain and trouble walking     Active Hospital Problems    Diagnosis  POA    **Hypertensive urgency [I16.0]  Yes    CVA (cerebral vascular accident) [I63.9]  Yes      Resolved Hospital Problems   No resolved problems to display.          Hospital Course:  Aretha Angela is a 83 y.o. female who developed neck pain at home, also difficulty walking, also noted transient RUE weakness and came to ED were her systolic BP was 220.  She has not been consistent with taking BP meds at home.   She was worked up for possible CVA        Transient RUE weakness  - possible TIA  - although MRI brain showed R frontal lobe infarct (punctate), this does not correlate clinically    CVA frontal lobe lesion seen on MRI (acute vs subacute) punctate   -- stroke neuro followed   -- continue ASA, HD statin, 6 month f/u with stroke neuro clinic   -- PT worked with her.  She is ambulatory but they are recommending inpatient rehab  - However, the patient was reluctant to go to IRF.  She and her son have decided she will go home and continue her current outpt rehab at the [facility]   -Echo EF 70.  Tissue AVR     HTN urgency  Recent noncompliance with home meds   - initially required Cardene gtt  - now on oral meds.  Tends to run 120 systolic in morning but higher in evening.    - May need continued adjustment of meds but most important thing is to take them as prescribed       Neck/back/shoulder pain  - CTA of head/neck showed no vascular causes    - resolved with improvement in BP      History of aortic bioprosthetic valve placed in 2010, due to severe aortic stenosis  - echo shows stable valve function           Discharge Follow Up Recommendations for outpatient labs/diagnostics:   - FU with stroke clinic in six months     - PCP in 2 weeks     Day of Discharge     HPI:   States she and her son have decided she will just go home.      Review of Systems  No chest pain  No headache or neck pain     Vital Signs:   Temp:  [97.4 øF (36.3 øC)-98.1 øF (36.7 øC)] 98.1 øF (36.7 øC)  Heart Rate:  [60-76] 70  Resp:  [18-20] 18  BP: (143-184)/(76-89) 175/78      Physical Exam:  Gen:  WD/WN woman in bed, NAD , alert and conversant   Neuro: alert and oriented, clear speech, follows commands, grossly nonfocal.   HEENT:  NC/AT  Neck:  Supple, no LAD  Heart RRR   Abd:  Soft, nontender  Extrem:  No c/c/e      Pertinent  and/or Most Recent Results     LAB RESULTS:      Lab 10/06/23  0354 10/05/23  2328   WBC 7.52 7.87   HEMOGLOBIN 13.1 12.3   HEMATOCRIT 40.6 38.7   PLATELETS 217 195   NEUTROS ABS 6.28 6.55   IMMATURE GRANS (ABS) 0.03 0.04   LYMPHS ABS 0.88 0.82   MONOS ABS 0.28 0.32   EOS ABS 0.01 0.09   MCV 91.4 91.5   LACTATE  --  1.3   PROTIME  --  13.1   APTT  --  28.9   D DIMER QUANT  --  0.91*         Lab 10/06/23  0354 10/06/23  0138 10/05/23  2328 10/05/23  2250   SODIUM 141  --  139  --    POTASSIUM 3.8  --  4.0  --    CHLORIDE 104  --  106  --    CO2 27.0  --  25.0  --    ANION GAP 10.0  --  8.0  --    BUN 16  --  18  --    CREATININE 0.72  --  0.75 0.80   EGFR 83.1  --  79.1  --    GLUCOSE 133*  --  198*  --    CALCIUM 9.6  --  9.3  --    MAGNESIUM 2.3  --  2.2  --    HEMOGLOBIN A1C 5.20  --   --   --    TSH  --  0.856  --   --          Lab 10/06/23  0354 10/05/23  6577   TOTAL PROTEIN 7.3 7.1   ALBUMIN 4.0 3.9   GLOBULIN 3.3 3.2   ALT (SGPT) 10 9   AST (SGOT) 15 16   BILIRUBIN 0.2 0.2   ALK PHOS 90 86   LIPASE  --  50         Lab 10/06/23  0354 10/06/23  0134  10/05/23  2328   PROBNP  --   --  438.0   HSTROP T 14* 15* 15*   PROTIME  --   --  13.1   INR  --   --  0.98         Lab 10/06/23  0354   CHOLESTEROL 185   LDL CHOL 97   HDL CHOL 74*   TRIGLYCERIDES 75             Brief Urine Lab Results  (Last result in the past 365 days)        Color   Clarity   Blood   Leuk Est   Nitrite   Protein   CREAT   Urine HCG        10/06/23 0008 Yellow   Clear   Negative   Negative   Negative   Negative                 Microbiology Results (last 10 days)       ** No results found for the last 240 hours. **            Adult Transthoracic Echo Limited W/ Cont if Necessary Per Protocol    Result Date: 10/8/2023    Left ventricular systolic function is hyperdynamic (EF > 70%). Left ventricular ejection fraction appears to be greater than 70%.   Left ventricular wall thickness is consistent with moderate concentric hypertrophy.   21 mm bovine prosthetic aortic valve with mild to moderate aortic insufficiency which appears stable compared to previous study.  Transvalvular velocities are mild to moderately elevated and slightly higher than previous, likely due to hyperdynamic LV systolic function.  Overall valve structure and function appear stable compared to previous study.   Peak velocity of the flow distal to the aortic valve is 327 cm/s. Aortic valve mean pressure gradient is 24 mmHg.     MRI Brain Without Contrast    Result Date: 10/6/2023  MRI BRAIN WO CONTRAST Date of Exam: 10/6/2023 7:05 PM EDT Indication: Stroke, follow up.  Comparison: None available. Technique:  Routine multiplanar/multisequence sequence images of the brain were obtained without contrast administration. FINDINGS: There is a small punctate focus of abnormal diffusion restriction within the periventricular white matter adjacent to the posterior horn of the right lateral ventricle within the posterior right frontal lobe. Increased T2 and FLAIR signal are noted. The findings suggest a small focus of acute to subacute  ischemia. Advanced diffuse white matter signal changes are noted indicating chronic small vessel ischemic changes or age-related changes. Associated diffuse volume loss is noted. Scattered foci of susceptibility artifact are noted suggesting underlying amyloid angiopathy. There is no evidence for significant abnormal cerebral edema. There is no mass effect or midline shift. The ventricular system is nondilated. The basilar cisterns are patent. The midline structures are unremarkable. No significant extra-axial  fluid collections are identified. No significant abnormal signal is observed involving the paranasal sinuses or mastoid air cells.     1.Evidence for a punctate focus of acute/subacute ischemic change within the periventricular white matter adjacent to the posterior horn of the right lateral ventricle at the posterior aspect of the right frontal lobe. 2.Extensive diffuse nonspecific white matter signal changes are noted with associated diffuse volume loss. These findings are most consistent with extensive chronic small vessel ischemic changes or age-related changes. Scattered susceptibility artifact is also noted suggesting underlying amyloid angiopathy. Electronically Signed: Leandro Chávez MD  10/6/2023 7:41 PM EDT  Workstation ID: [id number]    CT Angiogram Head w AI Analysis of LVO    Result Date: 10/6/2023  CT ANGIOGRAM NECK, CT ANGIOGRAM HEAD W AI ANALYSIS OF LVO Date of Exam: 10/6/2023 2:01 AM EDT Indication: Neuro deficit, acute stroke suspected. Comparison: None available. Technique: CTA of the neck was performed before and after the uneventful intravenous administration of 115 mL Isovue-370. Reconstructed coronal and sagittal images were also obtained. In addition, a 3-D volume rendered image was created for interpretation. Automated exposure control and iterative reconstruction methods were used. Findings: Aortic arch: The aortic arch is unremarkable.  There is conventional 3 vessel arch anatomy.   The right brachiocephalic and visualized bilateral subclavian arteries are within normal limits. Right carotid: The right CCA arises as expected from the brachiocephalic trunk.  The CCA follows a normal course and appears normal caliber.  The carotid bifurcation is unremarkable.  The external carotid artery and distal branches appear within normal limits.  The cervical internal carotid artery follows a normal course and appears normal caliber throughout the neck and into the head.  The intracranial ICA segments appear within normal limits.  The ophthalmic artery origin is normal.  The A1 and M1 segments appear within normal limits.  The visualized distal RAMON and MCA branches appear patent.  There is  a patent  anterior communicating artery. There is a patent  posterior communicating artery. Left carotid: The left CCA arises as expected from the aortic arch.   The CCA follows a normal course and appears normal caliber.  The carotid bifurcation is unremarkable.  The external carotid artery and distal branches appear within normal limits.  The  cervical internal carotid artery follows a normal course and appears normal caliber throughout the neck and into the head.  The intracranial ICA segments appear within normal limits.  The ophthalmic artery origin is normal.  The A1 and M1 segments appear within normal limits.  The visualized distal RAMON and MCA branches appear patent.  There is a patent  posterior communicating artery. Posterior circulation: Vertebral arteries arise as expected from ipsilateral subclavian arteries. The left vertebral artery is dominant. The vertebral arteries follow a normal course and appear normal caliber throughout the neck and into the head.  The V4 segments are patent.  Visualized posterior inferior cerebellar arteries are within normal limits.  The basilar artery is normal caliber.  Superior cerebellar arteries are patent.  Bilateral P1 segments and posterior cerebral arteries appear within  normal limits. The prominence of the basilar artery suggested on CT is not felt to represent aneurysm. Nonvascular findings: There is no acute intracranial abnormality. There is atrophy and chronic microvascular ischemic change. Orbits are within normal limits.  Paranasal sinuses and mastoid air cells appear well aerated.  Superficial soft tissues and underlying musculature appear within normal limits.  The lung apices are clear.  The thyroid and salivary glands appear unremarkable.  The suprahyoid and infrahyoid spaces of the neck appear unremarkable.  There is no evidence of cervical lymphadenopathy  or a neck mass.  There are no acute osseous abnormalities or destructive bone lesions.     Impression: No acute large vessel occlusion, stenosis, aneurysm or vascular malformation. Electronically Signed: Ricco Wisdom MD  10/6/2023 2:32 AM EDT  Workstation ID: [id number]    CT Angiogram Neck    Result Date: 10/6/2023  CT ANGIOGRAM NECK, CT ANGIOGRAM HEAD W AI ANALYSIS OF LVO Date of Exam: 10/6/2023 2:01 AM EDT Indication: Neuro deficit, acute stroke suspected. Comparison: None available. Technique: CTA of the neck was performed before and after the uneventful intravenous administration of 115 mL Isovue-370. Reconstructed coronal and sagittal images were also obtained. In addition, a 3-D volume rendered image was created for interpretation. Automated exposure control and iterative reconstruction methods were used. Findings: Aortic arch: The aortic arch is unremarkable.  There is conventional 3 vessel arch anatomy.  The right brachiocephalic and visualized bilateral subclavian arteries are within normal limits. Right carotid: The right CCA arises as expected from the brachiocephalic trunk.  The CCA follows a normal course and appears normal caliber.  The carotid bifurcation is unremarkable.  The external carotid artery and distal branches appear within normal limits.  The cervical internal carotid artery follows a normal  course and appears normal caliber throughout the neck and into the head.  The intracranial ICA segments appear within normal limits.  The ophthalmic artery origin is normal.  The A1 and M1 segments appear within normal limits.  The visualized distal RAMON and MCA branches appear patent.  There is  a patent  anterior communicating artery. There is a patent  posterior communicating artery. Left carotid: The left CCA arises as expected from the aortic arch.   The CCA follows a normal course and appears normal caliber.  The carotid bifurcation is unremarkable.  The external carotid artery and distal branches appear within normal limits.  The  cervical internal carotid artery follows a normal course and appears normal caliber throughout the neck and into the head.  The intracranial ICA segments appear within normal limits.  The ophthalmic artery origin is normal.  The A1 and M1 segments appear within normal limits.  The visualized distal RAMON and MCA branches appear patent.  There is a patent  posterior communicating artery. Posterior circulation: Vertebral arteries arise as expected from ipsilateral subclavian arteries. The left vertebral artery is dominant. The vertebral arteries follow a normal course and appear normal caliber throughout the neck and into the head.  The V4 segments are patent.  Visualized posterior inferior cerebellar arteries are within normal limits.  The basilar artery is normal caliber.  Superior cerebellar arteries are patent.  Bilateral P1 segments and posterior cerebral arteries appear within normal limits. The prominence of the basilar artery suggested on CT is not felt to represent aneurysm. Nonvascular findings: There is no acute intracranial abnormality. There is atrophy and chronic microvascular ischemic change. Orbits are within normal limits.  Paranasal sinuses and mastoid air cells appear well aerated.  Superficial soft tissues and underlying musculature appear within normal limits.  The  lung apices are clear.  The thyroid and salivary glands appear unremarkable.  The suprahyoid and infrahyoid spaces of the neck appear unremarkable.  There is no evidence of cervical lymphadenopathy  or a neck mass.  There are no acute osseous abnormalities or destructive bone lesions.     Impression: No acute large vessel occlusion, stenosis, aneurysm or vascular malformation. Electronically Signed: Ricco Wisdom MD  10/6/2023 2:32 AM EDT  Workstation ID: [id number]    CT CEREBRAL PERFUSION WITH & WITHOUT CONTRAST    Result Date: 10/6/2023  CT CEREBRAL PERFUSION W WO CONTRAST Date of Exam: 10/6/2023 2:01 AM EDT Indication: Neuro deficit, acute stroke suspected.  Comparison: None available. Technique: Axial CT images of the brain were obtained prior to and after the administration of 115 mL Isovue-370. Core blood volume, core blood flow, mean transit time, and Tmax images were obtained utilizing the Rapid software protocol. A limited CT angiogram of the head was also performed to measure the blood vessel density. The radiation dose reduction device was turned on for each scan per the ALARA (As Low as Reasonably Achievable) protocol. Findings: Total hypoperfusion: Using a Tmax threshold of greater than 6 seconds, there is no evidence of hypoperfusion. Core infarct: Using a threshold of cerebral blood flow less than 30%, there is no evidence of core infarct. Ischemic penumbra: There is no evidence of ischemic penumbra     Impression: Normal CT perfusion Electronically Signed: Ricco Wisdom MD  10/6/2023 2:26 AM EDT  Workstation ID: [id number]    CT Head Without Contrast Stroke Protocol    Result Date: 10/6/2023  CT HEAD WO CONTRAST STROKE PROTOCOL Date of Exam: 10/6/2023 1:56 AM EDT Indication: Neuro deficit, acute, stroke suspected Neuro deficit, acute stroke suspected. Comparison: None available. Technique: Axial CT images were obtained of the head without contrast administration.  Reconstructed coronal images were also  obtained. Automated exposure control and iterative construction methods were used. Scan Time: October 6, 2023 at 0157 hours Results discussed with the stroke team representative, Ben at 0205 hours. Findings: Contrast is seen within the vascular system from earlier exam. The basilar artery is prominent. Please refer to CT angiogram findings. There is mild diffuse generalized atrophy. There are low-attenuation areas in the periventricular white matter consistent with chronic microvascular ischemic change. There is no mass, mass effect or midline shift. There are no abnormal extra-axial fluid collections or areas of acute hemorrhage. The paranasal sinuses are clear. The mastoid air cells are clear.     Impression: Mild atrophy and chronic microvascular ischemia. No acute intracranial process. Electronically Signed: Ricco Wisdom MD  10/6/2023 2:06 AM EDT  Workstation ID: [id number]    CT Angiogram Chest    Result Date: 10/5/2023  CT ANGIOGRAM CHEST Date of Exam: 10/5/2023 10:46 PM EDT Indication: Chest pain, nonspecific. Comparison: None available. Technique: CTA of the chest was performed after the uneventful intravenous administration of 75 cc Isovue 370. Reconstructed coronal and sagittal images were also obtained. In addition, a 3-D volume rendered image was created for interpretation. Automated exposure control and iterative reconstruction methods were used. Findings: The thoracic inlet is unremarkable. There are no enlarged axillary lymph nodes . There is no vascular filling defects to suggest pulmonary emboli. There is no acute infiltrate. There is no pleural effusion. There is mild cephalization of the pulmonary vessels with smooth septal thickening suggestive of a mild pulmonary vascular congestion. The heart is enlarged. There are calcifications of the aortic valve. There are no coronary artery calcifications. There is a simple cyst in the right kidney. There is a moderate-sized hiatal hernia containing a small  portion of the stomach.     Impression: No vascular filling defect to suggest pulmonary emboli. Findings most suggestive of mild pulmonary vascular congestion. Cardiomegaly. Hiatal hernia. Electronically Signed: Gio Kevin MD  10/5/2023 11:02 PM EDT  Workstation ID: [id number]    CT Abdomen Pelvis Without Contrast    Result Date: 10/5/2023  CT ABDOMEN PELVIS WO CONTRAST Date of Exam: 10/5/2023 10:39 PM EDT Indication: Nausea/vomiting. Comparison: None available. Technique: Axial CT images were obtained of the abdomen and pelvis without the administration of contrast. Reconstructed coronal and sagittal images were also obtained. Automated exposure control and iterative construction methods were used. FINDINGS: The lung bases are clear without evidence for focal consolidation or significant pleural effusion. A small hiatal hernia is noted. Associated reflux is observed. The liver, spleen, and pancreas are unremarkable without contrast. The gallbladder is decompressed. No biliary dilatation is seen. The bilateral adrenal glands are symmetric and unremarkable without contrast. No definitive nephrolithiasis is seen bilaterally. There is no hydronephrosis or hydroureter. There is no evidence for obstructive uropathy. No stones are seen in the bladder. Bilateral renal cysts are noted. There is no bowel dilatation or obstruction. There is no evidence for acute appendicitis. There is a fluid density focus within the right abdomen adjacent to the proximal ascending colon. This finding may be related to a mesenchymal cyst or enteric duplication cyst. This finding measures approximately 5 cm. No significant free fluid is observed. No significant lymphadenopathy is seen throughout the abdomen or pelvis. The bladder is partially decompressed. No acute osseous abnormalities are observed.     1.No evidence for significant nephrolithiasis. There is no evidence for obstructive uropathy. 2.No evidence for acute abnormality  throughout the abdomen or pelvis on this noncontrast exam. 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 4.There is a small hiatal hernia. There is associated reflux. Electronically Signed: Leandro Chávez MD  10/5/2023 10:56 PM EDT  Workstation ID: [id number]             Results for orders placed during the hospital encounter of 10/05/23    Adult Transthoracic Echo Limited W/ Cont if Necessary Per Protocol    Interpretation Summary    Left ventricular systolic function is hyperdynamic (EF > 70%). Left ventricular ejection fraction appears to be greater than 70%.    Left ventricular wall thickness is consistent with moderate concentric hypertrophy.    21 mm bovine prosthetic aortic valve with mild to moderate aortic insufficiency which appears stable compared to previous study.  Transvalvular velocities are mild to moderately elevated and slightly higher than previous, likely due to hyperdynamic LV systolic function.  Overall valve structure and function appear stable compared to previous study.    Peak velocity of the flow distal to the aortic valve is 327 cm/s. Aortic valve mean pressure gradient is 24 mmHg.    Discharge Details        Discharge Medications        New Medications        Instructions Start Date   atorvastatin 80 MG tablet  Commonly known as: LIPITOR   80 mg, Oral, Nightly      lisinopril 5 MG tablet  Commonly known as: PRINIVIL,ZESTRIL   5 mg, Oral, Nightly             Continue These Medications        Instructions Start Date   aspirin 81 MG chewable tablet   81 mg, Oral, Daily      metoprolol succinate XL 50 MG 24 hr tablet  Commonly known as: TOPROL-XL   50 mg, Oral, Daily               No Known Allergies      Discharge Disposition:  Home or Self Care    Diet:  Hospital:  Diet Order   Procedures    Diet: Regular/House Diet, Cardiac Diets; Healthy Heart (2-3 Na+); Texture: Regular Texture  (IDDSI 7); Fluid Consistency: Thin (IDDSI 0)            Activity:  As tolerated.    OT notes that she had no overt loss of balance but was unsteady during ADLs.  They did recommend inpt rehab.  Patient and son declined.                CODE STATUS:    Code Status and Medical Interventions:   Ordered at: 10/06/23 0230     Level Of Support Discussed With:    Patient     Code Status (Patient has no pulse and is not breathing):    CPR (Attempt to Resuscitate)     Medical Interventions (Patient has pulse or is breathing):    Full Support       Additional Instructions for the Follow-ups that You Need to Schedule       Discharge Follow-up with PCP   As directed       Currently Documented PCP:    Kt Heredia MD    PCP Phone Number:    [phone]     Follow Up Details: 2 weeks        Discharge Follow-up with Specialty: [facility] stroke clinic; 6 Months   As directed      Specialty: [facility] stroke clinic   Follow Up: 6 Months                      Tayla So MD  10/10/23      Time Spent on Discharge:  I spent  45  minutes on this discharge activity which included: face-to-face encounter with the patient, reviewing the data in the system, coordination of the care with the nursing staff as well as consultants, documentation, and entering orders.

## 2023-10-11 ENCOUNTER — READMISSION MANAGEMENT (OUTPATIENT)
Dept: CALL CENTER | Facility: HOSPITAL | Age: 83
End: 2023-10-11
Payer: MEDICARE

## 2023-10-11 NOTE — OUTREACH NOTE
Prep Survey      Flowsheet Row Responses   [facility] facility patient discharged from? [facility]   Is LACE score < 7 ? No   Eligibility Readm Mgmt   Discharge diagnosis CVA (cerebral vascular accident)   Does the patient have one of the following disease processes/diagnoses(primary or secondary)? Stroke   Does the patient have Home health ordered? No   Is there a DME ordered? No   Prep survey completed? Yes            Lexii SOARES - Registered Nurse

## 2023-10-11 NOTE — OUTREACH NOTE
Stroke Week 1 Survey      Flowsheet Row Responses   [facility] facility patient discharged from? [facility]   Does the patient have one of the following disease processes/diagnoses(primary or secondary)? Stroke   Week 1 attempt successful? No   Unsuccessful attempts Attempt 1  [called home number and son's number. no answer.]            Sharon CADENA - Registered Nurse

## 2023-10-12 ENCOUNTER — READMISSION MANAGEMENT (OUTPATIENT)
Dept: CALL CENTER | Facility: HOSPITAL | Age: 83
End: 2023-10-12
Payer: MEDICARE

## 2023-10-12 NOTE — OUTREACH NOTE
Stroke Week 1 Survey      Flowsheet Row Responses   [facility] patient discharged from? [place]   Does the patient have one of the following disease processes/diagnoses(primary or secondary)? Stroke   Week 1 attempt successful? Yes   Call start time 1345   Call end time 1400   Discharge diagnosis CVA (cerebral vascular accident)   Person spoke with today (if not patient) and relationship beverly Blevins   Meds reviewed with patient/caregiver? Yes   Is the patient having any side effects they believe may be caused by any medication additions or changes? No   Does the patient have all medications ordered at discharge? Yes   Prescription comments son had questions about Lipitor, side effects discussed, pt is still taking, advised to discuss Lipitor with PCP at appt   Is the patient taking all medications as directed (includes completed medication regime)? Yes   Comments regarding appointments *Count includes the [facility] Stroke clinic phone number given to son, agreed with plans to make appt withing 30 days of pt's hopsital d/c   Does the patient have a primary care provider?  Yes   Does the patient have an appointment with their PCP within 7 days of discharge? No   What is preventing the patient from scheduling follow up appointments within 7 days of discharge? Waiting on return call   Nursing Interventions Advised patient to make appointment, Educated patient on importance of making appointment   Has the patient kept scheduled appointments due by today? N/A   Comments son states awaiting call from [facility] for appt recommended by PCP   Has home health visited the patient within 72 hours of discharge? N/A   Psychosocial issues? No   Did the patient receive a copy of their discharge instructions? Yes   Nursing interventions Reviewed instructions with patient   What is the patient's perception of their health status since discharge? Improving   Nursing interventions Nurse provided patient education   Is the  patient/caregiver able to teach back the risk factors for a stroke? High blood pressure-goal below 120/80, High Cholesterol, Physical inactivity and obesity   Is the patient/caregiver able to teach back signs and symptoms related to disease process for when to call PCP? Yes   Is the patient/caregiver able to teach back signs and symptoms related to disease process for when to call 911? Yes   If the patient is a current smoker, are they able to teach back resources for cessation? Not a smoker   Is the patient/caregiver able to teach back the hierarchy of who to call/visit for symptoms/problems? PCP, Specialist, Home health nurse, Urgent Care, ED, 911 Yes   Additional teach back comments son states pt able to walk in park, appetite good   Is the patient able to teach back FAST for Stroke? B alance: Watch for sudden loss of balance, E yes: Check for vision loss, F ace: Look for an uneven smile, A rm: Check if one arm is weak, S peech: Listen for slurred speech, T salomón: Call 9-1-1 right away   Week 1 call completed? Yes   Call end time 1400            Stephanie FRANCIS - Registered Nurse

## 2024-03-14 ENCOUNTER — TELEPHONE (OUTPATIENT)
Dept: NEUROLOGY | Facility: CLINIC | Age: 84
End: 2024-03-14

## 2024-03-14 NOTE — TELEPHONE ENCOUNTER
LVM TO ASK ABOUT [employer] TRANSFER TO [facility] CL[facility].